# Patient Record
(demographics unavailable — no encounter records)

---
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
## 2018-01-04 NOTE — EDPHY
H & P


Time Seen by Provider: 01/04/18 16:18


HPI/ROS: 





HPI


Blood in urine.





29-year-old female by private vehicle.  She has a complex medical history which 

includes mental health illness, a recent miscarriage on December 13th of 2017 

and urinary retention.  She was seen in our emergency department for urinary 

retention on December 16th.  She was referred to a urologist.  She has been 

seen Dr. Zurita at [facility].  For the last 2 weeks she has been self 

catheterizing to relieve her retention.  She tells me the urologist are still 

trying to determine the cause of her urinary retention.  She reports today she 

has had some blood and small clots in her urine.  She denies fever.  She denies 

any significant abdominal pain. She reports that her last menstrual period was 

September 14th prior to her miscarriage.  She has had no back pain.  No 

vomiting. She denies any vaginal bleeding or vaginal discharge. No other 

complaints.





ROS:





Constitutional:  No fever, no chills.  No weakness.


Respiratory:  No cough.  No shortness of breath.


Cardiac:  No chest pain, no palpitations.


Gastrointestinal:  No abdominal pain, no vomiting, no diarrhea.


Genitourinary:  As above.  No dysuria or increased frequency with urination.


Musculoskeletal:  No back pain.  No neck pain.  No myalgias or arthralgias.


Skin:  No rashes.


Neurological:  No headache.  No focal weakness or altered sensation.





Past medical history:  Mental health disorder.  As above.





Social history:  Smokes cigarettes and marijuana.  Denies IV drugs or street 

drugs.  Here by herself currently.





Physical Exam:





General Appearance:  Alert, no distress.  This patient is responding to 

questions appropriately and in full sentences.  This patient appears well-

hydrated and well-nourished.


Eyes:  Pupils equal and round no pallor or injection.  No lid edema, erythema 

or injection.


Gastrointestinal:  Abdomen is soft and nontender on palpation throughout, no 

masses, bowel sounds normal.  No focal tenderness at McBurney's point.  No 

Regalado sign.


Neurological:  Motor sensory function is grossly intact.  Cranial nerves are 

normal.  Gait is normal.


Skin:  Warm and dry, no rashes.


Musculoskeletal:  No CVA tenderness bilaterally.


Extremities are symmetrical.  All joints range without pain or impingement.


Psychiatric:  No agitation.  No depression.





Database:





EKG:





Imaging:





Procedures:





Emergency department course:





Vital signs have been reviewed and are normal.  She is afebrile.  Urine sample 

to be obtained. Patient's presentation is consistent with cystitis/urinary 

tract infection.  I have reviewed her blood work which was taken just yesterday 

on January 3rd.  Her CBC is unremarkable.  Her complete metabolic panel is 

unremarkable. Renal function is normal.  She had a negative pregnancy test at 

this time.  Patient reports that she urinated just prior to coming to the 

emergency department.  We are encouraging oral fluids and will obtain a urine 

sample when possible.





5:20 p.m., patient re-evaluated.  Urinalysis indicates red blood cells with 

trace leukocyte esterase and occasional white blood cells.  However, given her 

need to catheterize and subsequent high risk for infection we will treat her 

with Keflex.  She was given 500 mg in the emergency department.  She will be 

given a prescription for this medication to be taken 4 times daily for the next 

5 days.  She is to follow up with her urologist early next week for re-

evaluation.  She feels comfortable with this plan.  She already has a scheduled 

appointment with her urologist next week.  Return to emergency department 

precautions were thoroughly reviewed with her.  All of her questions were 

answered.  She was discharged in good condition.











Differential Diagnosis:





The differential diagnosis on this patient includes but is not limited to 

urinary tract infection, cystitis.  Malignancy, nephritic syndrome unlikely.  

This represents a partial list of diagnoses considered.  These considerations 

are based on history, physical exam, past history, reassessment and diagnostic 

testing.


Smoking Status: Current some day smoker


Constitutional: 


 Initial Vital Signs











Temperature (C)  37.2 C   01/04/18 16:17


 


Heart Rate  93   01/04/18 16:17


 


Respiratory Rate  16   01/04/18 16:17


 


Blood Pressure  128/78 H  01/04/18 16:17


 


O2 Sat (%)  98   01/04/18 16:17








 











O2 Delivery Mode               Room Air














Allergies/Adverse Reactions: 


 





adhesive [Adhesive] Allergy (Verified 01/04/18 16:17)


 


aspirin [Aspirin] Allergy (Verified 01/04/18 16:17)


 


clindamycin [Clindamycin] Allergy (Verified 01/04/18 16:17)


 


cyclobenzaprine HCl [From Flexeril] Allergy (Verified 01/04/18 16:17)


 


gabapentin [From Neurontin] Allergy (Verified 01/04/18 16:17)


 


hydromorphone HCl [From Dilaudid] Allergy (Verified 01/04/18 16:17)


 


ipratropium bromide [From Atrovent] Allergy (Verified 01/04/18 16:17)


 


ketorolac tromethamine [From Toradol] Allergy (Verified 01/04/18 16:17)


 


latex [Latex] Allergy (Verified 01/04/18 16:17)


 


lorazepam [From Ativan] Allergy (Verified 01/04/18 16:17)


 


nabumetone [From Relafen] Allergy (Verified 12/13/17 12:53)


 


prednisone [Prednisone] Allergy (Verified 12/13/17 12:53)


 


vancomycin [Vancomycin] Allergy (Verified 12/13/17 12:53)


 








Home Medications: 














 Medication  Instructions  Recorded


 


ALPRAZolam [Xanax 0.25 MG (*)]  08/31/17


 


Ondansetron Odt [Zofran Odt] 4 - 8 mg PO Q4PRN PRN #4 tab 12/13/17


 


Enlite Serter  12/16/17


 


Prazosin HCl  12/16/17


 


Prozac 10 MG (*)  12/16/17


 


Cephalexin [Keflex (*)] 500 mg PO Q6 5 Days  cap 01/04/18














Medical Decision Making





- Data Points


Laboratory Results: 


 











  01/04/18 01/04/18





  17:00 17:00


 


Urine Color    OTHER 





   


 


Urine Appearance    HAZY 





   


 


Urine pH    7.0 





    (5.0-7.5) 


 


Ur Specific Gravity    <= 1.005 





    (1.002-1.030) 


 


Urine Protein    NEGATIVE 





    (NEGATIVE) 


 


Urine Ketones    NEGATIVE 





    (NEGATIVE) 


 


Urine Blood    3+  H 





    (NEGATIVE) 


 


Urine Nitrate    NEGATIVE 





    (NEGATIVE) 


 


Urine Bilirubin    NEGATIVE 





    (NEGATIVE) 


 


Urine Urobilinogen    0.2 EU EU





    (0.2-1.0) 


 


Ur Leukocyte Esterase    TRACE  H 





    (NEGATIVE) 


 


Urine RBC    25-50 /hpf H /hpf





    (0-3) 


 


Urine WBC    OCCASIONAL /hpf /hpf





    (0-3) 


 


Ur Epithelial Cells    TRACE /lpf /lpf





    (NONE-1+) 


 


Urine Bacteria    TRACE /hpf H /hpf





    (NONE SEEN) 


 


Urine Glucose    NEGATIVE 





    (NEGATIVE) 


 


Urine Pregnancy Test  NEGATIVE   





   











Medications Given: 


 








Discontinued Medications





Cephalexin HCl (Keflex)  500 mg PO EDNOW ONE


   PRN Reason: Protocol


   Stop: 01/04/18 17:22


   Last Admin: 01/04/18 17:37 Dose:  500 mg








Departure





- Departure


Disposition: Home, Routine, Self-Care


Clinical Impression: 


 Hematuria, Possible urinary tract infection





Condition: Good


Instructions:  Cephalexin (By mouth), Urinary Tract Infection in Women (ED), 

Hematuria (ED)


Additional Instructions: 


Read and follow provided instructions.





Follow-up with your urologist at [facility] next week as scheduled.





Take medication as prescribed through entire course of treatment.





Return to the emergency department for worsening symptoms, back pain, fever, 

vomiting or other serious concerns.


Referrals: 


[facility] ([facility],. [Edm Groups for Call Sched] - As per Instructions


Xiang Zurita MD [Medical Doctor] - As per Instructions


Prescriptions: 


Cephalexin [Keflex (*)] 500 mg PO Q6 5 Days  cap

## 2018-09-01 NOTE — EDPHY
H & P


Time Seen by Provider: 09/01/18 21:35


HPI/ROS: 





CHIEF COMPLAINT:  PICC line not flowing





HISTORY OF PRESENT ILLNESS:  30-year-old female currently 9 weeks pregnant 

history of hyperemesis gravidarum with placement of right upper extremity PICC 

line 1 week ago to self administer IV Zofran presents to the ER via private 

vehicle stating that the PICC line is occluded.  She denies discoloration.  

Denies chest pain.  Denies abdominal pain.  Denies back or flank pain.  Denies 

fever chills.








REVIEW OF SYSTEMS:


10 systems reviewed and negative with the exception of the elements mentioned 

in the history of present illness








PAST MEDICAL & SURGICAL  HISTORY:  Currently 9 weeks pregnant.  Hyperemesis 

gravidarum





SOCIAL HISTORY:  Nonsmoker   











************


PHYSICAL EXAM





(Prior to examination, patient consented to physical exam, hands were washed 

and my usual and customary physical exam procedures followed)


1) GENERAL: Well-developed, well-nourished, alert and oriented.  Appears to be 

in no acute distress.


2) HEAD: Normocephalic, atraumatic


3) HEENT: Pupils equal, round, reactive to light bilaterally.  Sclera 

anicteric. 


4) NECK: Full range of motion, no meningeal signs.


5) LUNGS: Clear auscultation bilaterally, no wheezes, no rhonchi, no 

retractions.   


6) HEART: Regular rate and rhythm, no murmur, no heave, no gallop.


7) ABDOMEN: No guarding, no rebound, no focal tenderness, negative McBurney's, 

negative Regalado's, negative Rovsing's, negative peritoneal sign,


8) MUSCULOSKELETAL:  Right upper extremity:  PICC line insertion site in place 

with no signs of infection, no tenderness, no erythema, no induration.  Distal 

neurovascular status is normal with brisk pulses and capillary refill.  Soft 

compartments.


9) BACK: No CVA tenderness, no midline vertebral tenderness, no fluctuance, no 

step-off, no obvious trauma, no visual or palpable abnormality. 


10) SKIN: No rash, no petechiae. 


11) Psychiatric:  Patient is oriented X 3, there is no agitation.








***************





DIFFERENTIAL DIAGNOSIS:   In no particular order including but not limited to 

occluded PICC line, hyperemesis gravidarum, displaced PICC line





Smoking Status: Former smoker


Constitutional: 


 Initial Vital Signs











Temperature (C)  37.5 C   09/01/18 21:16


 


Heart Rate  79   09/01/18 21:16


 


Respiratory Rate  18   09/01/18 21:16


 


Blood Pressure  137/79 H  09/01/18 21:16


 


O2 Sat (%)  85 L  09/01/18 21:16








 











O2 Delivery Mode               Room Air














Allergies/Adverse Reactions: 


 





gabapentin Allergy (Unknown, Unverified 09/01/18 21:15)


 Pt reports hallucination


haloperidol Allergy (Unknown, Unverified 09/01/18 21:15)


 Pt reports "body on fire"


lorazepam Allergy (Unknown, Unverified 09/01/18 21:15)


 Pt unsure of reaction


nabumetone Allergy (Unknown, Unverified 09/01/18 21:15)


 Pt unsure of reaction


adhesive [Adhesive] Allergy (Verified 09/01/18 21:15)


 Pt reports hives


aspirin [Aspirin] Allergy (Verified 09/01/18 21:15)


 Pt unsure of reaction


clindamycin [Clindamycin] Allergy (Verified 09/01/18 21:15)


 Pt reports seizure


cyclobenzaprine HCl [From Flexeril] Allergy (Verified 09/01/18 21:15)


 Pt reports inability to urinate


hydromorphone HCl [From Dilaudid] Allergy (Verified 09/01/18 21:15)


 Pt reports migraine


ipratropium bromide [From Atrovent] Allergy (Verified 09/01/18 21:15)


 Pt reports throat itching


ketorolac tromethamine [From Toradol] Allergy (Verified 09/01/18 21:15)


 Pt unsure of reaction


latex [Latex] Allergy (Verified 09/01/18 21:15)


 Pt reports itching


prednisone [Prednisone] Allergy (Verified 09/01/18 21:15)


 Pt reports rash with oral prednisone


vancomycin [Vancomycin] Allergy (Verified 09/01/18 21:15)


 Pt reports seizure


cyclobenzaprine HCl Allergy (Unknown, Uncoded 09/01/18 21:15)


 Pt reports inability to urinate


hydromorphone HCl Allergy (Unknown, Uncoded 09/01/18 21:15)


 Pt reports migraine


ipratropium bromide Allergy (Unknown, Uncoded 09/01/18 21:15)


 Pt reports throat itching


ketorolac tromethamine Allergy (Unknown, Uncoded 09/01/18 21:15)


 Pt unsure of reaction








Home Medications: 














 Medication  Instructions  Recorded


 


Herbals/Supplements -Info Only 1 ea PO DAILY 08/21/18


 


Lithium Carbonate [Lithium 300 mg PO DAILY 08/21/18





Carbonate Cap 300 mg (*)]  


 


Lithium Carbonate [Lithium 600 mg PO HS 08/21/18





Carbonate Cap 300 mg (*)]  


 


Promethazine HCl [Phenergan 25mg 25 mg PO Q6HRS PRN  tab 08/24/18





(*)]  


 


Pyridoxine HCl [Vitamin B-6 25 mg 25 mg PO TID PRN  tab 08/24/18





(*)]  














MDM/Departure





- MDM


ED Course/Re-evaluation: 





Nursing staff has manipulated the patient's PICC line and is now patent.  She 

declines antiemetic in the emergency department stating that she has IV Zofran 

at home.  Plan will be discharge home.  Instructed on trouble shooting PICC 

lines.  I saw this patient independently based on established practice 

protocols.  Care of patient under supervision of  secondary supervising 

physician Dr Kaminski .  Initial pulse oxygenation 85% on room air, repeat at 9:

40 p.m. was 97% on room air.





- Depart


Disposition: Home, Routine, Self-Care


Clinical Impression: 


Occluded PICC line


Qualifiers:


 Encounter type: initial encounter Qualified Code(s): T82.898A - Other 

specified complication of vascular prosthetic devices, implants and grafts, 

initial encounter





Condition: Good


Instructions:  Hyperemesis Gravidarum (ED)


Additional Instructions: 


If your PICC line stops working, if you are unable to pass medicine or any 

other symptoms, seek medical attention


Referrals: 


Rose Sifuentes MD [Primary Care Provider] - As per Instructions

## 2018-09-27 NOTE — EDPHY
H & P


Stated Complaint: Dizzy, disoriented, fatigued x 24 hrs. Pregnant 13wks.


Time Seen by Provider: 18 13:16


HPI/ROS: 





CHIEF COMPLAINT:  Fatigue, PICC line does not work





HISTORY OF PRESENT ILLNESS:  30-year-old  female 13 weeks pregnant presents 

with PICC line malfunction and fatigue.  She had a PICC line placed for hyper 

emesis and has been receiving IV fluids twice daily.  Last IV fluids yesterday.

  Onset of pain in the right upper extremity 2 days ago, described as achiness.

  Onset of excessive fatigue yesterday, associated with low-grade temperature 

of 99 degrees.  No URI symptoms, abdominal pain, chest pain or dysuria.  Seen 

by [facility] just prior to arrival and sent to the ED for PICC line 

problem.





REVIEW OF SYSTEMS:  complete 10 point ROS reviewed and is negative except for 

the noted elements in the HPI








- Personal History


LMP (Females 10-55): Pregnant


Current Tetanus/Diphtheria Vaccine: Yes


Current Tetanus Diphtheria and Acellular Pertussis (TDAP): Yes


Tetanus Vaccine Date: within 10 years





- Medical/Surgical History


Hx Asthma: No


Hx Chronic Respiratory Disease: No


Hx Diabetes: No


Hx Cardiac Disease: No


Hx Renal Disease: No


Hx Cirrhosis: No


Hx Alcoholism: Yes


Hx HIV/AIDS: No


Hx Splenectomy or Spleen Trauma: No


Other PMH: SINUS SURGERY, TONSILLECTOMY,  . Aspergers. PTSD, 

bipolar.  Appendectomy.  1 , 2 's,





- Social History


Smoking Status: Former smoker


Alcohol Use: Sober


Drug Use: None





- Physical Exam


Exam: 





General Appearance:  Drowsy, nontoxic-appearing, pleasant


Eyes:  Pupils equal and round, no conjunctival pallor or injection


ENT, Mouth:  Mucous membranes moist


Neck:  Normal inspection


Respiratory:  Lungs are clear to auscultation


Cardiovascular:  Regular rate and rhythm


Gastrointestinal:  Abdomen is soft and nontender


Neurological:  Drowsy, oriented x3, nonfocal exam


Skin:  Warm and dry


Extremities:  Right upper extremity-PICC line in place, no surrounding erythema 

warmth or tenderness, no tenderness or swelling of the right upper extremity


Vascular:  2+ radial pulses


Psychiatric:  Flat affect





Constitutional: 


 Initial Vital Signs











Temperature (C)  37.2 C   18 12:10


 


Heart Rate  85   18 12:10


 


Respiratory Rate  16   18 12:10


 


Blood Pressure  136/82 H  18 12:10


 


O2 Sat (%)  98   18 12:10








 











O2 Delivery Mode               Room Air














Allergies/Adverse Reactions: 


 





gabapentin Allergy (Unknown, Unverified 18 21:15)


 Pt reports hallucination


haloperidol Allergy (Unknown, Unverified 18 21:15)


 Pt reports "body on fire"


lorazepam Allergy (Unknown, Unverified 18 21:15)


 Pt unsure of reaction


nabumetone Allergy (Unknown, Unverified 18 21:15)


 Pt unsure of reaction


adhesive [Adhesive] Allergy (Verified 18 21:15)


 Pt reports hives


aspirin [Aspirin] Allergy (Verified 18 21:15)


 Pt unsure of reaction


clindamycin [Clindamycin] Allergy (Verified 18 21:15)


 Pt reports seizure


cyclobenzaprine HCl [From Flexeril] Allergy (Verified 18 21:15)


 Pt reports inability to urinate


hydromorphone HCl [From Dilaudid] Allergy (Verified 18 21:15)


 Pt reports migraine


ipratropium bromide [From Atrovent] Allergy (Verified 18 21:15)


 Pt reports throat itching


ketorolac tromethamine [From Toradol] Allergy (Verified 18 21:15)


 Pt unsure of reaction


latex [Latex] Allergy (Verified 18 21:15)


 Pt reports itching


prednisone [Prednisone] Allergy (Verified 18 21:15)


 Pt reports rash with oral prednisone


vancomycin [Vancomycin] Allergy (Verified 18 21:15)


 Pt reports seizure


cyclobenzaprine HCl Allergy (Unknown, Uncoded 18 21:15)


 Pt reports inability to urinate


hydromorphone HCl Allergy (Unknown, Uncoded 18 21:15)


 Pt reports migraine


ipratropium bromide Allergy (Unknown, Uncoded 18 21:15)


 Pt reports throat itching


ketorolac tromethamine Allergy (Unknown, Uncoded 18 21:15)


 Pt unsure of reaction








Home Medications: 














 Medication  Instructions  Recorded


 


Herbals/Supplements -Info Only 1 ea PO DAILY 18


 


Lithium Carbonate [Lithium 300 mg PO DAILY 18





Carbonate Cap 300 mg (*)]  


 


Lithium Carbonate [Lithium 600 mg PO HS 18





Carbonate Cap 300 mg (*)]  


 


Promethazine HCl [Phenergan 25mg 25 mg PO Q6HRS PRN  tab 18





(*)]  


 


Pyridoxine HCl [Vitamin B-6 25 mg 25 mg PO TID PRN  tab 18





(*)]  














Medical Decision Making





- Diagnostics


Imaging Results: 


 Imaging Impressions





Extremity Venous Study  18 13:29


Impression: No evidence of a right upper extremity DVT.


 


Findings and recommendations discussed with MOLLY MTZ  at 1443 hour, 2018.








PICC Line Exchange  18 16:48


Impression:  4 [place] single lumen peripherally inserted central catheter is 

ready to use.


 











ED Course/Re-evaluation: 





This patient presents with excessive fatigue and right upper extremity 

discomfort.  No evidence of infection on physical exam.  Leukocytosis noted; in 

review of previous WBC's, leukocytosis present throughout this pregnancy 

without change today.  Right upper extremity ultrasound reveals no evidence of 

DVT. Cathflo placed in the PICC line, unsuccessful.  Ultimately the PICC line 

was replaced and the left upper extremity.  The patient was tolerating oral 

fluids and food well.  Felt better after IV hydration and oral intake.  Unclear 

etiology of low-grade fever.  Encouraged patient to follow up with PCP. Will 

return for worsening symptoms or any concerns.


Differential Diagnosis: 





Differential diagnosis includes severe dehydration, DVT, pyelonephritis, 

cholecystitis, influenza, cellulitis, pneumonia, abscess, meningitis.





- Data Points


Laboratory Results: 


 Laboratory Results





 18 13:48 





 18 13:48 





 











  18





  13:49 13:48 13:48


 


WBC      12.81 10^3/uL H 10^3/uL





     (3.80-9.50) 


 


RBC      4.20 10^6/uL 10^6/uL





     (4.18-5.33) 


 


Hgb      12.8 g/dL g/dL





     (12.6-16.3) 


 


Hct      36.6 % L %





     (38.0-47.0) 


 


MCV      87.1 fL fL





     (81.5-99.8) 


 


MCH      30.5 pg pg





     (27.9-34.1) 


 


MCHC      35.0 g/dL g/dL





     (32.4-36.7) 


 


RDW      13.2 % %





     (11.5-15.2) 


 


Plt Count      175 10^3/uL 10^3/uL





     (150-400) 


 


MPV      11.2 fL fL





     (8.7-11.7) 


 


Neut % (Auto)      79.4 % H %





     (39.3-74.2) 


 


Lymph % (Auto)      14.0 % L %





     (15.0-45.0) 


 


Mono % (Auto)      4.4 % L %





     (4.5-13.0) 


 


Eos % (Auto)      1.0 % %





     (0.6-7.6) 


 


Baso % (Auto)      0.3 % %





     (0.3-1.7) 


 


Nucleat RBC Rel Count      0.0 % %





     (0.0-0.2) 


 


Absolute Neuts (auto)      10.16 10^3/uL H 10^3/uL





     (1.70-6.50) 


 


Absolute Lymphs (auto)      1.79 10^3/uL 10^3/uL





     (1.00-3.00) 


 


Absolute Monos (auto)      0.57 10^3/uL 10^3/uL





     (0.30-0.80) 


 


Absolute Eos (auto)      0.13 10^3/uL 10^3/uL





     (0.03-0.40) 


 


Absolute Basos (auto)      0.04 10^3/uL 10^3/uL





     (0.02-0.10) 


 


Absolute Nucleated RBC      0.00 10^3/uL 10^3/uL





     (0-0.01) 


 


Immature Gran %      0.9 % %





     (0.0-1.1) 


 


Immature Gran #      0.12 10^3/uL H 10^3/uL





     (0.00-0.10) 


 


Sodium    136 mEq/L mEq/L  





    (135-145)  


 


Potassium    3.6 mEq/L mEq/L  





    (3.3-5.0)  


 


Chloride    105 mEq/L mEq/L  





    ()  


 


Carbon Dioxide    23 mEq/l mEq/l  





    (22-31)  


 


Anion Gap    8 mEq/L mEq/L  





    (8-16)  


 


BUN    10 mg/dL mg/dL  





    (7-23)  


 


Creatinine    0.5 mg/dL L mg/dL  





    (0.6-1.0)  


 


Estimated GFR    > 60   





    


 


Glucose    71 mg/dL mg/dL  





    ()  


 


Calcium    9.2 mg/dL mg/dL  





    (8.5-10.4)  


 


Urine Color  PALE YELLOW     





    


 


Urine Appearance  CLEAR     





    


 


Urine pH  6.0     





   (5.0-7.5)   


 


Ur Specific Gravity  1.005     





   (1.002-1.030)   


 


Urine Protein  NEGATIVE     





   (NEGATIVE)   


 


Urine Ketones  NEGATIVE     





   (NEGATIVE)   


 


Urine Blood  NEGATIVE     





   (NEGATIVE)   


 


Urine Nitrate  NEGATIVE     





   (NEGATIVE)   


 


Urine Bilirubin  NEGATIVE     





   (NEGATIVE)   


 


Urine Urobilinogen  NEGATIVE EU EU    





   (0.2-1.0)   


 


Ur Leukocyte Esterase  NEGATIVE     





   (NEGATIVE)   


 


Urine Glucose  NEGATIVE     





   (NEGATIVE)   











Medications Given: 


 








Discontinued Medications





Alteplase, Recombinant (Cathflo Activase)  2 mg IVP EDNOW ONE


   Stop: 18 15:00


   Last Admin: 18 15:25 Dose:  2 mg


Sodium Chloride (Ns)  1,000 mls @ 0 mls/hr IV ONCE ONE; Wide Open


   PRN Reason: Protocol


   Stop: 18 13:30


   Last Admin: 18 14:05 Dose:  1,000 mls








Departure





- Departure


Disposition: Home, Routine, Self-Care


Referrals: 


Rose Sifuentes MD [Primary Care Provider] - As per Instructions

## 2018-12-03 NOTE — EDPHY
H & P


Time Seen by Provider: 12/03/18 18:21


HPI/ROS: 





This patient presents with a cough that she feels is not improving after 

starting Zithromax Friday, 4 days prior to arrival.  The cough started 7 days 

prior to this visit.  She was prescribed a Z-Kwabena by her primary care physician.

  She was seen by her OBGYN physician earlier today who recommended that she 

come in for evaluation because of ongoing cough.  The patient reports is 

primarily dry cough with wheeze.  She denies any additional symptoms.  She 

simply reports the cough is not improved and the wheeze persists.  She was not 

initially prescribed inhaler.  She has use an inhaler during bronchitis 

episodes in the past however.  Her brother drove the patient here by private 

vehicle.  The patient is 6 months pregnant with healthy intrauterine pregnancy





ROS:  Constitutional:  No fevers


HEENT:  Mild coryza.  No sinus pain


Pulmonary:  No pleuritic pain or hemoptysis.  No respiratory distress


Cardiovascular:  No heart palpitations or lightheadedness.  She has mild leg 

swelling she attributes to pregnancy with no calf pain


GI:  No nausea or vomiting


Integumentary no rash


Neuro:  No headache


:  No vaginal bleeding


10 point review of symptoms is performed and otherwise negative with exception 

of pertinent positives and negatives listed in HPI and ROS








Past Medical/Surgical History: 





I reviewed the nurse's documentation past with history is patient


Smoking Status: Former smoker


Physical Exam: 





Physical Exam


Vital signs are normal.


General:  No acute distress


HEENT:  Nose:  Clear discharge bilaterally. No sinus tenderness to percussion.  

Ears:  External canals and tympanic membranes are clear with no erythema or 

abnormal findings bilaterally. Oropharynx:  No erythema or exudates. No 

dysphonia.  No drooling or stridor.  


Eyes:  Pupils equal and react to light.  Extraocular motions are intact.


Neck:  Supple with no meningismus.  No lymphadenopathy


Lungs:  Mild expiratory wheeze bilaterally.  Minimal rhonchi.  No rales.


Cardiac:  Regular rate and rhythm with no murmur gallop or rub.  She has no 

calf tenderness and no significant edema at this time.


Abdomen:  Gravid uterus-nontender


Skin:  No rash or pallor.


Neuro:  Alert with no focal deficits noted.





Initial differential diagnosis:  URI with cough and reactive airway disease, 

bronchitis, doubt pneumonia given lack of fever, hypoxia or rales.


Constitutional: 


 Initial Vital Signs











Temperature (C)  37 C   12/03/18 18:20


 


Heart Rate  88   12/03/18 18:20


 


Respiratory Rate  20   12/03/18 18:20


 


Blood Pressure  138/80 H  12/03/18 18:20


 


O2 Sat (%)  98   12/03/18 18:20








 











O2 Delivery Mode               Room Air














Allergies/Adverse Reactions: 


 





gabapentin Allergy (Unknown, Verified 12/03/18 18:20)


 Pt reports hallucination


haloperidol Allergy (Unknown, Verified 12/03/18 18:20)


 Pt reports "body on fire"


lorazepam Allergy (Unknown, Verified 12/03/18 18:20)


 Pt unsure of reaction


nabumetone Allergy (Unknown, Verified 12/03/18 18:20)


 Pt unsure of reaction


adhesive [Adhesive] Allergy (Verified 12/03/18 18:20)


 Pt reports hives


aspirin [Aspirin] Allergy (Verified 12/03/18 18:20)


 Pt unsure of reaction


clindamycin [Clindamycin] Allergy (Verified 12/03/18 18:20)


 Pt reports seizure


cyclobenzaprine HCl [From Flexeril] Allergy (Verified 12/03/18 18:20)


 Pt reports inability to urinate


hydromorphone HCl [From Dilaudid] Allergy (Verified 12/03/18 18:20)


 Pt reports migraine


ipratropium bromide [From Atrovent] Allergy (Verified 12/03/18 18:20)


 Pt reports throat itching


ketorolac tromethamine [From Toradol] Allergy (Verified 12/03/18 18:20)


 Pt unsure of reaction


latex [Latex] Allergy (Verified 12/03/18 18:20)


 Pt reports itching


prednisone [Prednisone] Allergy (Verified 12/03/18 18:20)


 Pt reports rash with oral prednisone


vancomycin [Vancomycin] Allergy (Verified 12/03/18 18:20)


 Pt reports seizure


cyclobenzaprine HCl Allergy (Unknown, Uncoded 12/03/18 18:20)


 Pt reports inability to urinate


hydromorphone HCl Allergy (Unknown, Uncoded 12/03/18 18:20)


 Pt reports migraine


ipratropium bromide Allergy (Unknown, Uncoded 12/03/18 18:20)


 Pt reports throat itching


ketorolac tromethamine Allergy (Unknown, Uncoded 12/03/18 18:20)


 Pt unsure of reaction








Home Medications: 














 Medication  Instructions  Recorded


 


Herbals/Supplements -Info Only 1 ea PO DAILY 08/21/18


 


Lithium Carbonate [Lithium 300 mg PO DAILY 08/21/18





Carbonate Cap 300 mg (*)]  


 


Lithium Carbonate [Lithium 600 mg PO HS 08/21/18





Carbonate Cap 300 mg (*)]  


 


Albuterol Hfa Anes Only [Proair 2 puffs IH Q4 PRN #1 mdi 12/03/18





Hfa Icu (*)]  


 


Calcium  12/03/18


 


Docusate Sodium  12/03/18


 


Fluticasone Hfa 220 Mcg [Flovent 2 puffs IH DAILY #1 mdi 12/03/18





220 MCG Hfa MDI (*)]  


 


Magnesium  12/03/18


 


Multivitamin  12/03/18


 


Omega-3  12/03/18


 


Senna  12/03/18


 


Vitamin C  12/03/18


 


l-Methylfolate-Algal 15 mg Cap  12/03/18














MDM/Departure





- MDM


Medications Given: 


 








Discontinued Medications





Albuterol (Proventil Neb)  3 ml IH EDNOW ONE


   Stop: 12/03/18 18:41


   Last Admin: 12/03/18 18:45 Dose:  3 ml


Albuterol (Proventil Neb)  3 ml IH EDNOW ONE


   Stop: 12/03/18 19:20


   Last Admin: 12/03/18 19:35 Dose:  3 ml


Levalbuterol (Xopenex 1.25mg Neb)  1.25 mg IH EDNOW ONE


   Stop: 12/03/18 19:01


   Last Admin: 12/03/18 19:11 Dose:  1.25 mg





ED Course/Re-evaluation: 





Albuterol neb, Xopenex neb, albuterol neb with mild reduction wheezing in 

frequency of cough.





Discussion:  Patient with bronchitis versus URI with cough and reactive airway 

disease improved mildly with beta agonist.  Will plan to treat her with 

albuterol inhaler and Flovent.  I counseled regarding this.  Instructed her to 

complete her Zithromax course in addition.  She does not have clinical findings 

that suggest lower respiratory infection, pulmonary embolism or other red flag 

findings.  However she understands need to return emergency department should 

she develop worsening symptoms despite treatment plan





- Depart


Disposition: Home, Routine, Self-Care


Clinical Impression: 


Acute bronchitis


Qualifiers:


 Bronchitis organism: unspecified organism Qualified Code(s): J20.9 - Acute 

bronchitis, unspecified





Condition: Good


Instructions:  Acute Bronchitis (ED)


Additional Instructions: 


Diagnosis:  Bronchitis





Plan:  Humidifier





Complete you're Zithromax antibiotic





Albuterol inhaler with spacer for cough, wheeze or shortness of breath





Flovent steroid inhaler in addition to diminish the inflammation in bronchi





Follow up primary care physician if her symptoms are not improving over the 

next 5-7 days with treatment plan





Return emergency department for significant shortness of breath despite 

albuterol, high fevers or other concerns


Prescriptions: 


Albuterol Hfa Anes Only [Proair Hfa Icu (*)] 2 puffs IH Q4 PRN #1 mdi


 PRN Reason: Wheezing


Fluticasone Hfa 220 Mcg [Flovent 220 MCG Hfa MDI (*)] 2 puffs IH DAILY #1 mdi


Referrals: 


Rose Sifuentes MD [Primary Care Provider] - As per Instructions

## 2018-12-20 NOTE — EDPHY
H & P


Stated Complaint: injection site infected


Time Seen by Provider: 18 20:30


HPI/ROS: 





CHIEF COMPLAINT:  Hematoma





HISTORY OF PRESENT ILLNESS:  Patient is a 30-year-old obese female who is 25 

weeks gestational age receive supplemental progesterone because of  

labor with her previous children.  She received an injection in her right 

triceps area on Monday.  She has had bruising and swelling in that area ever 

since.  She has had itchy reactions in the past from the shots but never 

bruising and swelling.  She has been taking Benadryl for the itching.  She was 

concerned about infection and her primary recommended she come to the ER for 

evaluation.  She has not had a fever.  No swelling in her hand elbow or wrist.  

Normal pulses.  Normal sensation and movement.


Severity:  Moderate


Modifying factors:  None





REVIEW OF SYSTEMS:


Constitutional:  denies: chills, fever, recent illness, recent injury


EENTM: denies: blurred vision, double vision, nose congestion


Respiratory: denies: cough, shortness of breath


Cardiac: denies: chest pain, irregular heart rate, lightheadedness, palpitations


Gastrointestinal/Abdominal: denies: abdominal pain, diarrhea, nausea, vomiting, 

blood streaked stools


Genitourinary: denies: dysuria, frequency, hematuria, pain


Musculoskeletal: denies: joint pain, muscle pain


Skin: denies: lesions, rash, jaundice, bruising


Neurological: denies: headache, numbness, paresthesia, tingling, dizziness, 

weakness


Hematologic/Lymphatic: denies: blood clots, easy bleeding, easy bruising


Immunologic/allergic: denies: HIV/AIDS, transplant


 10 systems reviewed and negative except as noted





EXAM:


GENERAL:  Well-appearing, obese and in no acute distress.


HEAD:  Atraumatic, normocephalic.


EYES:  Pupils equal round and reactive to light, extraocular movements intact, 

sclera anicteric, conjunctiva are normal.


ENT:  TMs normal, nares patent, oropharynx clear without exudates.  Moist 

mucous membranes.


NECK:  Normal range of motion, supple without lymphadenopathy or JVD.


LUNGS:  Breath sounds clear to auscultation bilaterally and equal.  No wheezes 

rales or rhonchi.


HEART:  Regular rate and rhythm without murmurs, rubs or gallops.


ABDOMEN:  Soft, nontender, normoactive bowel sounds.  No guarding, no rebound.  

No masses appreciated. 


BACK:  No CVA tenderness, no spinal tenderness, step-offs or deformities


EXTREMITIES:  Normal range of motion, no pitting or edema.  No clubbing or 

cyanosis.


NEUROLOGICAL:  Cranial nerves II through XII grossly intact.  Normal speech, 

normal gait.  5/5 strength, normal movement in all extremities, normal sensation

, normal reflexes


PSYCH:  Normal mood, normal affect.


SKIN:  Patient has a firm hematoma and bruising to the right triceps area, very 

minimal redness and warmth.  No cellulitic changes.  No abscess.  No fluctuance.








Source: Patient


Exam Limitations: No limitations





- Personal History


LMP (Females 10-55): Pregnant


Tetanus Vaccine Date: 





- Medical/Surgical History


Hx Asthma: No


Hx Chronic Respiratory Disease: No


Hx Diabetes: No


Hx Cardiac Disease: No


Hx Renal Disease: No


Hx Cirrhosis: No


Hx Alcoholism: Yes


Hx HIV/AIDS: No


Hx Splenectomy or Spleen Trauma: No


Other PMH: SINUS SURGERY, TONSILLECTOMY,  . Aspergers. PTSD, 

bipolar.  Appendectomy.  1 , 2 's,





- Family History


Significant Family History: No pertinent family hx





- Social History


Smoking Status: Former smoker


Alcohol Use: Sober


Drug Use: None


Constitutional: 


 Initial Vital Signs











Temperature (C)  36.6 C   18 20:24


 


Heart Rate  87   18 20:24


 


Respiratory Rate  20   18 20:24


 


Blood Pressure  167/75 H  18 20:24


 


O2 Sat (%)  97   18 20:24








 











O2 Delivery Mode               Room Air














Allergies/Adverse Reactions: 


 





gabapentin Allergy (Unknown, Verified 18 18:20)


 Pt reports hallucination


haloperidol Allergy (Unknown, Verified 18 18:20)


 Pt reports "body on fire"


lorazepam Allergy (Unknown, Verified 18 18:20)


 Pt unsure of reaction


nabumetone Allergy (Unknown, Verified 18 18:20)


 Pt unsure of reaction


adhesive [Adhesive] Allergy (Verified 18 18:20)


 Pt reports hives


aspirin [Aspirin] Allergy (Verified 18 18:20)


 Pt unsure of reaction


clindamycin [Clindamycin] Allergy (Verified 18 18:20)


 Pt reports seizure


cyclobenzaprine HCl [From Flexeril] Allergy (Verified 18 18:20)


 Pt reports inability to urinate


hydromorphone HCl [From Dilaudid] Allergy (Verified 18 18:20)


 Pt reports migraine


ipratropium bromide [From Atrovent] Allergy (Verified 18 18:20)


 Pt reports throat itching


ketorolac tromethamine [From Toradol] Allergy (Verified 18 18:20)


 Pt unsure of reaction


latex [Latex] Allergy (Verified 18 18:20)


 Pt reports itching


prednisone [Prednisone] Allergy (Verified 18 18:20)


 Pt reports rash with oral prednisone


vancomycin [Vancomycin] Allergy (Verified 18 18:20)


 Pt reports seizure


cyclobenzaprine HCl Allergy (Unknown, Uncoded 18 18:20)


 Pt reports inability to urinate


hydromorphone HCl Allergy (Unknown, Uncoded 18 18:20)


 Pt reports migraine


ipratropium bromide Allergy (Unknown, Uncoded 18 18:20)


 Pt reports throat itching


ketorolac tromethamine Allergy (Unknown, Uncoded 18 18:20)


 Pt unsure of reaction








Home Medications: 














 Medication  Instructions  Recorded


 


Herbals/Supplements -Info Only 1 ea PO DAILY 18


 


Lithium Carbonate [Lithium 300 mg PO DAILY 18





Carbonate Cap 300 mg (*)]  


 


Lithium Carbonate [Lithium 600 mg PO HS 18





Carbonate Cap 300 mg (*)]  


 


Albuterol Hfa Anes Only [Proair 2 puffs IH Q4 PRN #1 mdi 18





Hfa Icu (*)]  


 


Calcium  18


 


Docusate Sodium  18


 


Fluticasone Hfa 220 Mcg [Flovent 2 puffs IH DAILY #1 mdi 18





220 MCG Hfa MDI (*)]  


 


Magnesium  18


 


Multivitamin  18


 


Omega-3  18


 


Senna  18


 


Vitamin C  18


 


l-Methylfolate-Algal 15 mg Cap  18














Medical Decision Making


ED Course/Re-evaluation: 





The patient has a resolving hematoma to her right triceps area.  The bruising 

is fading.  I suspect that the very slight warmth and erythema is from the 

resolving hematoma.  I am not suspicious of cellulitis or abscess.  She is 

afebrile.  I am not suspicious for DVT based on the history and location and 

exam of the rest of her arm.  I encouraged ibuprofen as well as heat packs.  

She will also continue taking Benadryl.  We discussed indications for returning 

including fevers or worsening erythema.  Also recommended she follow up with 

her doctor in the next 24-48 hours for re-evaluation.  I do not think 

antibiotics are indicated at this time.


Differential Diagnosis: 





Partial list of the Differential diagnosis considered include but were not 

limited to;  hematoma, cellulitis, allergic reaction, lymphadenopathy and 

although unlikely based on the history and physical exam, I also considered 

abscess, dissection, aneurysm, DVT.  I discussed these differential diagnoses 

and the plan with the patient as well as the usual and expected course.  The 

patient understands that the diagnosis is provisional and that in medicine we 

are not always correct and that further workup is often warranted.  Usual and 

customary warnings were given.  All of the patient's questions were answered.  

The patient was instructed to return to the emergency department should the 

symptoms at all worsen or return, otherwise to followup with the physician as 

we discussed.





Departure





- Departure


Disposition: Home, Routine, Self-Care


Clinical Impression: 


 Hematoma and contusion





Condition: Fair


Instructions:  Hematoma (ED)


Referrals: 


Rose Sifuentes MD [Primary Care Provider] - 1-2 days without fail

## 2018-12-25 NOTE — EDPHY
H & P


Time Seen by Provider: 12/25/18 22:45


HPI/ROS: 





Chief complaint: Thumb skin avulsion 5 hours ago 





HPI: 


Using a potatoe [place], she caught the top of the nondominant thumb, left.  It 

will not stop bleeding.  Occurred in the kitchen, at home. 





 Right Handed 


Injury of the left thumb, from 


This happened at home  , occurring  5 hr ago


Reports there is no numbness or loss of sensation.


Contamination:  None except for in the kitchen


FB possibility  no





Last Td or TDAP:   more than 5 years but less than 10 years  





Work:  Homemaker


Avocation:  Homemaker








ROS


Neuro: No numbness or tingling or loss of sensation











Smoking Status: Former smoker


Physical Exam: 





Gen:   Well-developed.  Well-nourished.  No odor of alcohol.  Nontoxic.  

Afebrile.


Extremity: There is a 1 x 0.5 x 1 cm, triangular  avulsion laceration that is 

split  thickness to the IP joint on the dorsum of the left thumb that does not 

go to the tendon.


Function:    Without signs of tendon dysfunction


NV Status:  Intact


CMS:  Intact


Constitutional: 


 Initial Vital Signs











Temperature (C)  36.6 C   12/25/18 22:42


 


Heart Rate  97   12/25/18 22:42


 


Respiratory Rate  16   12/25/18 22:42


 


Blood Pressure  140/85 H  12/25/18 22:42


 


O2 Sat (%)  96   12/25/18 22:42








 











O2 Delivery Mode               Room Air














Allergies/Adverse Reactions: 


 





gabapentin Allergy (Unknown, Verified 12/25/18 22:42)


 Pt reports hallucination


haloperidol Allergy (Unknown, Verified 12/25/18 22:42)


 Pt reports "body on fire"


lorazepam Allergy (Unknown, Verified 12/25/18 22:42)


 Pt unsure of reaction


nabumetone Allergy (Unknown, Verified 12/25/18 22:42)


 Pt unsure of reaction


adhesive [Adhesive] Allergy (Verified 12/25/18 22:42)


 Pt reports hives


aspirin [Aspirin] Allergy (Verified 12/25/18 22:42)


 Pt unsure of reaction


clindamycin [Clindamycin] Allergy (Verified 12/25/18 22:42)


 Pt reports seizure


cyclobenzaprine HCl [From Flexeril] Allergy (Verified 12/25/18 22:42)


 Pt reports inability to urinate


hydromorphone HCl [From Dilaudid] Allergy (Verified 12/25/18 22:42)


 Pt reports migraine


ipratropium bromide [From Atrovent] Allergy (Verified 12/25/18 22:42)


 Pt reports throat itching


ketorolac tromethamine [From Toradol] Allergy (Verified 12/25/18 22:42)


 Pt unsure of reaction


latex [Latex] Allergy (Verified 12/25/18 22:42)


 Pt reports itching


prednisone [Prednisone] Allergy (Verified 12/25/18 22:42)


 Pt reports rash with oral prednisone


vancomycin [Vancomycin] Allergy (Verified 12/25/18 22:42)


 Pt reports seizure


cyclobenzaprine HCl Allergy (Unknown, Uncoded 12/25/18 22:42)


 Pt reports inability to urinate


hydromorphone HCl Allergy (Unknown, Uncoded 12/25/18 22:42)


 Pt reports migraine


ipratropium bromide Allergy (Unknown, Uncoded 12/25/18 22:42)


 Pt reports throat itching


ketorolac tromethamine Allergy (Unknown, Uncoded 12/25/18 22:42)


 Pt unsure of reaction








Home Medications: 














 Medication  Instructions  Recorded


 


Herbals/Supplements -Info Only 1 ea PO DAILY 08/21/18


 


Lithium Carbonate [Lithium 300 mg PO DAILY 08/21/18





Carbonate Cap 300 mg (*)]  


 


Lithium Carbonate [Lithium 600 mg PO HS 08/21/18





Carbonate Cap 300 mg (*)]  


 


Albuterol Hfa Anes Only [Proair 2 puffs IH Q4 PRN #1 mdi 12/03/18





Hfa Icu (*)]  


 


Calcium  12/03/18


 


Docusate Sodium  12/03/18


 


Fluticasone Hfa 220 Mcg [Flovent 2 puffs IH DAILY #1 mdi 12/03/18





220 MCG Hfa MDI (*)]  


 


Magnesium  12/03/18


 


Multivitamin  12/03/18


 


Omega-3  12/03/18


 


Senna  12/03/18


 


Vitamin C  12/03/18


 


l-Methylfolate-Algal 15 mg Cap  12/03/18














Medical Decision Making


ED Course/Re-evaluation: 





This is an avulsion that is not gape nor does it require any suturing.  However 

it has been oozing for 5 hr thus she needs some form of reasonable dressing 

which we will use Gel-Foam.  I have instructed her on the care of the Gel-Foam 

to allow this to heal well.


Differential Diagnosis: 





Diagnostic considerations include, but are not limited to, the following:


Laceration, retained FB ,tendon injury .








Departure





- Departure


Disposition: Home, Routine, Self-Care


Clinical Impression: 


Avulsion of skin of finger


Qualifiers:


 Encounter type: initial encounter Qualified Code(s): S61.209A - Unspecified 

open wound of unspecified finger without damage to nail, initial encounter





Condition: Good


Additional Instructions: 


Keep the finger splinted for 2 weeks





The SURGICELL against the wound is to stay on there as an instant scab, then 

fall off in 10-14 days, on its own.





Keep it strictly dry for 48  hours until thursday the 27th evening.  Then, you 

may get it wet just long enough to shower and occaisionally wash your hands.  

FOr the 2 weeks, if dealying with stuff in the kitchen such as food preparation 

or cleaning, you will need to wear gloves.


Referrals: 


Patient,NotPresent [Unknown] - As per Instructions

## 2019-01-13 NOTE — OBPROG
Labor Progress Note


Assessment/Plan: 


Assessment: 29 yo  at 28w1d here with 


1) contractions, in setting of hx of cerclage / hx of  deliveries 

x 3 - stable and a little improved since yesterday with regards to pt's 

impression of contractions.  Neg FFN is reassuring. Did not take vaginal 

progesterone last night. 


2) Labile BPs - normal preeclamptic labs though P/C not collected yet as pt has 

forgotten to collect urine sample with voids this morning. Hx of IOL for 

preeclampsia at 36 wk with P1





Plan: Stop IV hydration. 


Regular diet.  


Serial BPs. 


urine P/C.


Discussed - if it seems contractions are increasing, would administer 

betamethasone, and transfer to [facility] - higher level NICU. 


If it seems no significant change or worsening later this afternoon, would 

consider dc home. 


Maida Miranda MD, FACOG


[facility] 





19 11:56





19 16:42


A/P: 30  at 28w1d here with  contractions, but no evidence of 

 labor - no change in cervix in nearly 20 hours on digital exam, no 

change in length since 1/10/19 MFM scan.  


Will DC home with  labor precautions. 


No evidence of preeclampsia. 


FU in office in 1 week as already scheduled. 


Maida Miranda MD, FACOG





19 16:47





Subjective/Intrapartum Course: 





19 06:32


Pt sleeping after Morphine, feels ctxns happening but infreq and much milder.  

They also lessened after Terb last noc.  Denies any bleeding or LOF.  GFM.


19 12:00


Pt sleeping when I went to see earlier.  Awoke in past 1-1.5 hours and is now 

hungry.  Feels contractions are no worse and maybe a little better than when 

she came in yesterday evening.   Currently contractions are about a 4 of 10 in 

pain, though pt cannot say how often she is feeling them. Yesterday, the pain 

with contractions was up to a 6 of 10.  


No VB, no LOF. 


Did not take vaginal progesterone last night. 


19 16:48


Pt feels achy from contractions, but they are overall less than they were on 

admission. 


No VB, no LOF. 


Objective: 





 





 19 06:40 





 19 06:40 





 











Patient ABO/Rh  O POSITIVE   19  06:40    


 


Uric Acid  3.4 mg/dL (2.5-6.8)   19  06:40    


 


Total Bilirubin  0.1 mg/dL (0.1-1.4)   19  06:40    


 


Conjugated Bilirubin  0.1 mg/dL (0.0-0.5)   19  06:40    


 


Unconjugated Bilirubin  0.0 mg/dL (0.0-1.1)   19  06:40    


 


AST  18 IU/L (14-46)   19  06:40    


 


ALT  22 IU/L (9-52)   19  06:40    


 


Lactate Dehydrogenase  508 IU/L (313-618)   19  06:40    








 











Temp Pulse Resp BP Pulse Ox


 


    89          


 


    19 01:40         








BPs 123/60  134/64  129/67  





TVUS done - cervical length 3.6cm - 4.2cm, no funneling with fundal pressure.  


SVE repeated - no change 1/ long/ cerclage palpable. 





gen - pt appears completely comfortable. 


abd - soft, gravid, NT





P/C = 0.12  





- SVE


Dilation (cm): 1


Effacement (%): 0, Less than 50


Station: -3


Membranes: Intact





- Contraction Pattern Assessment


Current Contraction Pattern: Irregular, Other (Specify) (q5-10 min irreg)





- FHR Assessment


  ** Rankin


FHR (bpm): 150


FHR Pattern Variability: Moderate


FHR Category: 1





- AP


Antepartum Course: 





19 06:37


high survelliance with MFM and freq u/s - last on 1/10.  LGA at 91% with normal 

fluid.  





- Physical Exam


Estimated Fetal Weight: Less than 2500g (1367 on last u/s)





Oxytocin Orders Assessment





- Pre-Induction/Augmentation Assessment


Gestational Age: 28 week(s) and 0 day(s)


Estimated Fetal Weight: Less than 2500g (1367 on last u/s)





ICD10 Worksheet


Patient Problems: 


 Problems











Problem Status Onset


 


Bipolar 1 disorder Acute  


 


History of  delivery, currently pregnant Acute  


 


History of  delivery, currently pregnant Acute  


 


 contractions Acute  














- ICD10 Problem Qualifiers


(1) History of  delivery, currently pregnant

## 2019-01-13 NOTE — OBPROG
Labor Progress Note


Assessment/Plan: 


Assessment: 29 yo  at 28w1d here with 


1) contractions, in setting of hx of cerclage / hx of  deliveries 

x 3 - stable and a little improved since yesterday with regards to pt's 

impression of contractions.  Neg FFN is reassuring. Did not take vaginal 

progesterone last night. 


2) Labile BPs - normal preeclamptic labs though P/C not collected yet as pt has 

forgotten to collect urine sample with voids this morning. Hx of IOL for 

preeclampsia at 36 wk with P1





Plan: Stop IV hydration. 


Regular diet.  


Serial BPs. 


urine P/C.


Discussed - if it seems contractions are increasing, would administer 

betamethasone, and transfer to [facility] - higher level NICU. 


If it seems no significant change or worsening later this afternoon, would 

consider dc home. 


Maida Miranda MD, [facility] 





19 11:56





Subjective/Intrapartum Course: 





19 06:32


Pt sleeping after Morphine, feels ctxns happening but infreq and much milder.  

They also lessened after Terb last noc.  Denies any bleeding or LOF.  GFM.


19 12:00


Pt sleeping when I went to see earlier.  Awoke in past 1-1.5 hours and is now 

hungry.  Feels contractions are no worse and maybe a little better than when 

she came in yesterday evening.   Currently contractions are about a 4 of 10 in 

pain, though pt cannot say how often she is feeling them. Yesterday, the pain 

with contractions was up to a 6 of 10.  


No VB, no LOF. 


Did not take vaginal progesterone last night. 


Objective: 





 





 19 06:40 





 19 06:40 





 











Patient ABO/Rh  O POSITIVE   19  06:40    


 


Uric Acid  3.4 mg/dL (2.5-6.8)   19  06:40    


 


Total Bilirubin  0.1 mg/dL (0.1-1.4)   19  06:40    


 


Conjugated Bilirubin  0.1 mg/dL (0.0-0.5)   19  06:40    


 


Unconjugated Bilirubin  0.0 mg/dL (0.0-1.1)   19  06:40    


 


AST  18 IU/L (14-46)   19  06:40    


 


ALT  22 IU/L (9-52)   19  06:40    


 


Lactate Dehydrogenase  508 IU/L (313-618)   19  06:40    








 











Temp Pulse Resp BP Pulse Ox


 


    89          


 


    19 01:40         








gen - pleasant, sleepy female, flat affect


CV - RRR


chest - CTAB


abd - gravid, soft, NT


ext - trace BLE edema, no calf tenderness





BPs:


0500  144/85


0800  111/59


1044  128/94


1136  159/97 with newer larger appropriately fitting cuff, but apparently 

during a contraction


1139  123/60





- SVE


Membranes: Intact





- Contraction Pattern Assessment


Current Contraction Pattern: Other (Specify) (none on monitor but pt feels occas

)





- FHR Assessment


  ** Rankin


FHR (bpm): 140


FHR Pattern Variability: Moderate


FHR Category: 1 (occasional variable decel to 120 for < 10 seconds, reassuring 

for gestational age.)





- AP


Antepartum Course: 





19 06:37


high survelliance with MFM and freq u/s - last on 1/10.  LGA at 91% with normal 

fluid.  





- Physical Exam


Estimated Fetal Weight: Less than 2500g (1367 on last u/s)


Neck: non-tender, full range of motion, supple


Respiratory: lungs clear


Cardiac/Chest: regular rate, rhythm


Abdomen: normal bowel sounds (gravid, NT)


Extremities: normal range of motion, pedal edema (trace), Celina's sign (neg)


Skin: normal color, warm/dry


Neuro/Psych: alert (though sleepy, appropriately interactive though flat affect)





Oxytocin Orders Assessment





- Pre-Induction/Augmentation Assessment


Gestational Age: 28 week(s) and 0 day(s)


Estimated Fetal Weight: Less than 2500g (1367 on last u/s)





ICD10 Worksheet


Patient Problems: 


 Problems











Problem Status Onset


 


Bipolar 1 disorder Acute  


 


History of  delivery, currently pregnant Acute  


 


History of  delivery, currently pregnant Acute  


 


 contractions Acute  














- ICD10 Problem Qualifiers


(1) History of  delivery, currently pregnant
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
## 2019-01-13 NOTE — OBPROG
Labor Progress Note


Assessment/Plan: 


Assessment:


IUP at 28 wks,  ctxns seem to have lessened with sleep


 with all PTD, one at 30 wks, with cerclage in place, using vag prog


PCS x1,  x2, placenta ant and low


Bipolar on Lithium, sees therapist, mood stable


echo normal, rec echo after birth to reassess aortic arch


h/o preeclampsia with first, on LDA....several BP in 140s but diastolics 

low....will get PIH labs and urine for reference





























Plan:


Ctxns have really lessened per pt report - -2/10 and she feels infreq and pt 

sleeping through them.  No showing on monitor.


Will cont survelliance today as pt high risk and might need Mag and steroids if 

further building ctxns.


19 06:22





Subjective/Intrapartum Course: 





19 06:32


Pt sleeping after Morphine, feels ctxns happening but infreq and much milder.  

They also lessened after Terb last noc.  Denies any bleeding or LOF.  GFM.


Objective: 





 





 19 23:00 





 











Temp Pulse Resp BP Pulse Ox


 


    89          


 


    19 01:40         














- SVE


Membranes: Intact





- Contraction Pattern Assessment


Current Contraction Pattern: Other (Specify) (none on monitor but pt feels occas

)





- FHR Assessment


  ** Rankin


FHR (bpm): 140


FHR Pattern Variability: Moderate


FHR Category: 1 (reactive pattern with good variability and accels, occas small 

variable decels.)





- AP


Antepartum Course: 





19 06:37


high survelliance with MFM and freq u/s - last on 1/10.  LGA at 91% with normal 

fluid.  





- Physical Exam


General Appearance: WD/WN (when awoken and needing to urinate)


Estimated Fetal Weight: Less than 2500g (1367 on last u/s)


Abdomen: non-tender, soft


Extremities: non-tender


Skin: normal color, warm/dry


Neuro/Psych: alert, normal mood/affect (blunt affect with bipolar on meds)





Oxytocin Orders Assessment





- Pre-Induction/Augmentation Assessment


Gestational Age: 28 week(s) and 0 day(s)





ICD10 Worksheet


Patient Problems: 


 Problems











Problem Status Onset


 


Bipolar 1 disorder Acute  


 


History of  delivery, currently pregnant Acute  


 


 contractions Acute  














- ICD10 Problem Qualifiers


(1)  contractions








(2) History of  delivery, currently pregnant








(3) Bipolar 1 disorder

## 2019-02-20 NOTE — OBPROG
Labor Progress Note


Assessment/Plan: 


Assessment: 30  at 33w4d with preeclampsia without severe features. Per 

LISA Hawkins (fellow,I believe, at [employer]), administer betamethasone, 

continue observation.  If stable and no severe features, could consider 

outpatient management until IOL at 37 weeks.  If severe features develop, 

induction at 34 weeks.  Will obtain GBS swab. 


1) BPAD - recent lithium dose increase - likely etiology of polydipsia, 

dizziness. 


2) Mild electrolyte abnormalities - likely related to lithium dosing and higher 

lithium level. 


3) hx of PTD, cerclage in place - currently having asymptomatic contractions 

over the past hour - will observe.  Will need to remove cerclage if signs of 

labor.  


4) hx of C/S - has had 2 successful VBACs, and desires another. 





Dr. Segundo Alston - hospitalist, evaluating and assisting with medication / 

electrolyte assessment and management. Much appreciated. 





Maida Miranda MD, FACOG


[facility]








19 16:48





Subjective/Intrapartum Course: 





19 17:22


Pt doing OK, says she feels "crummy", though she can't pinpoint why and has a 

hard time being more specific.  


Is feeling some mild contractions.  NO headache, still some blurry vision.  NO 

chest pain.  Feels like she has to breathe faster sometimes and can't take a 

big breath.  No vaginal bleeding (though progesterone suppositories are red).  

No LOF. 


19 17:50





Objective: 





 





 19 10:07 





 19 10:07 





 











Uric Acid  4.1 mg/dL (2.5-6.8)   19  10:07    


 


Total Bilirubin  0.3 mg/dL (0.1-1.4)   19  10:07    


 


Conjugated Bilirubin  0.2 mg/dL (0.0-0.5)   19  10:07    


 


Unconjugated Bilirubin  0.1 mg/dL (0.0-1.1)   19  10:07    


 


AST  24 IU/L (14-46)   19  10:07    


 


ALT  30 IU/L (9-52)   19  10:07    


 


Lactate Dehydrogenase  607 IU/L (313-618)   02/20/19  10:07    








37.3  21  98  128/73  97% on RA  


BP highs 1345  156/85


at 1530 140/63


at 1545 152/76


at 1630 137/90





gen - flushed but nontoxic in appearance


CV - RRR


chest - CTAB


abd -gravid, soft, NT


ext - no calf tenderness, trace edema, min DTRs





FHR - 150 reactive


toco - q 5-7 min from about 2635-0368








- Contraction Pattern Assessment


Current Contraction Pattern: Irregular





- FHR Assessment


  ** Rankin


FHR (bpm): 150


FHR Pattern Variability: Moderate


FHR Category: 1





Oxytocin Orders Assessment





- Pre-Induction/Augmentation Assessment


Gestational Age: 33 week(s) and 4 day(s)





ICD10 Worksheet


Patient Problems: 


 Problems











Problem Status Onset


 


Pre-eclampsia during pregnancy in third trimester, antepartum Acute  


 


Bipolar 1 disorder Acute  


 


History of  delivery, currently pregnant Acute  


 


History of  delivery, currently pregnant Acute  


 


 contractions Acute  














- ICD10 Problem Qualifiers


(1) Pre-eclampsia during pregnancy in third trimester, antepartum

## 2019-02-20 NOTE — OBPROG
Labor Progress Note


Assessment/Plan: 


Assessment: 30  at 33w4d with preeclampsia without severe features. Per 

LISA Hawkins (fellow,I believe, at [employer]), administer betamethasone, 

continue observation.  If stable and no severe features, could consider 

outpatient management until IOL at 37 weeks.  If severe features develop, 

induction at 34 weeks.  Will obtain GBS swab. 


1) BPAD - recent lithium dose increase - likely etiology of polydipsia, 

dizziness. 


2) Mild electrolyte abnormalities - likely related to lithium dosing and higher 

lithium level. 


3) hx of PTD, cerclage in place - currently having asymptomatic contractions 

over the past hour - will observe.  Will need to remove cerclage if signs of 

labor.  


4) hx of C/S - has had 2 successful VBACs, and desires another. 





Dr. Segundo Alston - hospitalist, evaluating and assisting with medication / 

electrolyte assessment and management. Much appreciated. 





Maida Miranda MD, FACOG


[facility]








19 16:48





As above -currently stable. s/p Betameth #1 at 1741.


Lithium level slightly low at approx 11 hours after last dose, so will continue 

with pt's regular dosing of 900 mg bid. 


GBS swab was obtained. 


Plan to repeat preeclampsia labs in AM. 


Maida Miranda MD


19 20:42





Subjective/Intrapartum Course: 





19 17:22


Pt doing OK, says she feels "crummy", though she can't pinpoint why and has a 

hard time being more specific.  


Is feeling some mild contractions.  NO headache, still some blurry vision.  NO 

chest pain.  Feels like she has to breathe faster sometimes and can't take a 

big breath.  No vaginal bleeding (though progesterone suppositories are red).  

No LOF. 


19 17:50





Pt feeling much better than she was earlier today.  No HA, no blurry vision, no 

dyspnea.  Occas mild contraction. 


Good FM.  


19 20:44





Objective: 





 





 19 10:07 





 19 19:12 





 











Uric Acid  4.1 mg/dL (2.5-6.8)   19  10:07    


 


Total Bilirubin  0.3 mg/dL (0.1-1.4)   19  10:07    


 


Conjugated Bilirubin  0.2 mg/dL (0.0-0.5)   19  10:07    


 


Unconjugated Bilirubin  0.1 mg/dL (0.0-1.1)   19  10:07    


 


AST  24 IU/L (14-46)   19  10:07    


 


ALT  30 IU/L (9-52)   19  10:07    


 


Lactate Dehydrogenase  607 IU/L (313-618)   19  10:07    








No changes, still labile BPs but all less than 155/95





Lithium 0.5


Mag 1.9


sodium 136





- Contraction Pattern Assessment


Current Contraction Pattern: Irregular





- FHR Assessment


  ** Rankin


FHR (bpm): 150


FHR Pattern Variability: Moderate


FHR Category: 1





Oxytocin Orders Assessment





- Pre-Induction/Augmentation Assessment


Gestational Age: 33 week(s) and 4 day(s)





ICD10 Worksheet


Patient Problems: 


 Problems











Problem Status Onset


 


Pre-eclampsia during pregnancy in third trimester, antepartum Acute  


 


Bipolar 1 disorder Acute  


 


History of  delivery, currently pregnant Acute  


 


History of  delivery, currently pregnant Acute  


 


 contractions Acute  














- ICD10 Problem Qualifiers


(1) Pre-eclampsia during pregnancy in third trimester, antepartum

## 2019-02-20 NOTE — PDGENHP
History and Physical


History and Physical: 





HOSPITALIST CONSULT NOTE


 CC:  I am asked by Dr. Miranda of [facility] to assess this patient who is admitted 

at 33+ weeks pregnancy with preeclampsia and elevated lithium level





HISTORY:


This patient is hospital today with 33 weeks pregnancy and has preeclampsia 

based on per blood pressures and urine studies.  She is being treated with 

betamethasone at this time and seems to be doing fairly well.  She is having 

some intermittent contractions, baby is doing well on monitor.  She has had 3 

prior deliveries and 2 other pregnancies.  I am asked to see the patient at 

this time because of an elevated lithium level in slightly low sodium.





The patient currently says she feels "off", and feels that is slightly 

difficult to get her breath though no cough or chest pain or fever


She has bipolar disorder and is chronically on lithium.  Her usual lithium 

level 0.6-0.8.  Recently she has had some luba type symptoms and was having 

lithium levels and 0.4-0.5.  2 weeks ago her lithium dose was increased, and 1 

week ago her lithium level apparently was 0.6.  Today it is 1.3 although does 

not drawn at a dose samir.  She does complain of some sense of tremulousness, 

and she has felt quite thirsty drinking up to 200 oz of fluids per day over the 

last week.  She has a low sodium at this point but minimally.





ROS:  A comprehensive 10 system review revealed no other significant findings








PAST MEDICAL HISTORY:


Bipolar disorder


Previous pregnancies


Exercise-induced asthma


Obesity


Otherwise healthy





FAMILY MEDICAL HISTORY:


Nothing contributory at this time





SOCIAL HISTORY:


Partner


No tobacco, no alcohol for the past 1.5 years, no street drugs


Has rarely used marijuana in the past





MEDICATIONS:


The patients list has been reconciled by our clinical pharmacist in the EMR.  I 

have reviewed the list and ordered appropriate medicines.





PHYSICAL EXAMINATION:


Vital Signs:  Pulse just over 100, respirations at 20-22 during my exam but not 

labored and they are shallow likely due to her very gravid abdomen and obesity; 

no fever and blood pressures started off high are now better in good range


Oxygen saturation is 98-99% during my examination on room air








Examination:


Fetal heart rate in good range on monitor with appropriate responses to 

contraction during my visit


General: alert, oriented, good mentation, relaxed


Skin: warm, dry, good color, no rash; no cyanosis


HEENT: normal


Neck: no mass or jvd


Resps: relaxed


Lungs: clear breath sounds


Heart: regular, no murmur


Abdomen:  Gravid, obese, soft, nontender, +BS


Upper Extremities: normal, well perfused


Lower Extremities: no edema, warm, well perfused


No Bleeding or bruising


Neurologic:  Normal mentation, normal speech/language, normal CNs, no focal 

weakness


IV site: looks normal








LABORATORY DATA:


Sodium initially 134, CO2 21 but with anion gap 4, normal renal function, 

normal liver enzymes and bilirubin


Lithium 1.3 this morning


Urine with pH 9.0 specific gravity 1.004, total protein 19 and creatinine 19.4


White count 55193, hemoglobin 12 normal platelets








ASSESSMENT:


* Elevated lithium level:  Given the timing of the blood draw from her doses 

hard to tell if this represents to excessive lithium but I suspect given her 

symptoms that it does.  We have held 1 dose and I am rechecking a lithium level 

now, will continue to follow closely and may make dosing adjustments based on 

levels


* Hyponatremia very mild, minimally hypervolemic for her gravid state


* Preeclampsia, diagnosed and being managed by OB Gyn


* Sense of dyspnea right now I think is likely attributable to her very gravid 

and obese abdomen, she is oxygenating extremely well, nothing to suggest PE and 

no signs of heart failure, not wheezing


* History of exercise induced asthma, will continue her inhaled steroid, p.r.n. 

Bronchodilator


* Hospital Medicine will continue follow throughout her stay








I have reviewed the patient's case in detail with Dr. Miranda


I have reviewed the patient's past medical records as part of this assessment, 

including

## 2019-02-21 NOTE — OBPROG
Labor Progress Note


Assessment/Plan: 


Assessment: 30  at 33w4d with preeclampsia without severe features. Per 

LISA Hawkins (fellow,I believe, at [employer]), administer betamethasone, 

continue observation.  If stable and no severe features, could consider 

outpatient management until IOL at 37 weeks.  If severe features develop, 

induction at 34 weeks.  Will obtain GBS swab. 


1) BPAD - recent lithium dose increase - likely etiology of polydipsia, 

dizziness. 


2) Mild electrolyte abnormalities - likely related to lithium dosing and higher 

lithium level. 


3) hx of PTD, cerclage in place - currently having asymptomatic contractions 

over the past hour - will observe.  Will need to remove cerclage if signs of 

labor.  


4) hx of C/S - has had 2 successful VBACs, and desires another. 





Dr. Segundo Alston - hospitalist, evaluating and assisting with medication / 

electrolyte assessment and management. Much appreciated. 





Maida Miranda MD, [facility]








19 16:48





As above -currently stable. s/p Betameth #1 at 1741.


Lithium level slightly low at approx 11 hours after last dose, so will continue 

with pt's regular dosing of 900 mg bid. 


GBS swab was obtained. 


Plan to repeat preeclampsia labs in AM. 


Maida Miranda MD


19 20:42





19 01:07


A/P: 30  at 33w5d  - preeclampsia without severe features, having some 

mild contractions, cerclage in place.


1) s/p betameth #1 at 1740 on 19.  GBS pending. Plan to repeat 

preeclampsia labs in AM. 


2) BPAD - lithium level at 11 hours after dose was sub goal range, so regular 

dose of LIthium 900mg was given.  Buff capped and taking PO.  Sodium 

normalized. 


3) Hx of PTD / cerclage in place - contractions subsided for a while but have 

become more noticeable now - will continue to observe again.  Will try Ambien 

for sleep, but pt knows to let us know if intensity increases. 


4) Hx of C/S with 2 successful VBACs, desires another - will need to sign  

consent when taking out cerclage. 





[facility]ida Miranda MD, [facility]


[facility]


19 01:17





Subjective/Intrapartum Course: 





19 17:22


Pt doing OK, says she feels "crummy", though she can't pinpoint why and has a 

hard time being more specific.  


Is feeling some mild contractions.  NO headache, still some blurry vision.  NO 

chest pain.  Feels like she has to breathe faster sometimes and can't take a 

big breath.  No vaginal bleeding (though progesterone suppositories are red).  

No LOF. 


19 17:50





Pt feeling much better than she was earlier today.  No HA, no blurry vision, no 

dyspnea.  Occas mild contraction. 


Good FM.  


19 20:44





Pt feeling "crummy" again, but feels now is due to contractions which are not 

painful, but some are uncomfortable. No HA, no vis changes, no dyspnea.  No 

change in vaginal dc, though thinks a large part of vaginal prometrium came out 

when she was up to void.  No vaginal bleeding. No LOF. 


19 01:13





Objective: 





 





 19 10:07 





 19 19:12 





 











Uric Acid  4.1 mg/dL (2.5-6.8)   19  10:07    


 


Total Bilirubin  0.3 mg/dL (0.1-1.4)   19  10:07    


 


Conjugated Bilirubin  0.2 mg/dL (0.0-0.5)   19  10:07    


 


Unconjugated Bilirubin  0.1 mg/dL (0.0-1.1)   19  10:07    


 


AST  24 IU/L (14-46)   19  10:07    


 


ALT  30 IU/L (9-52)   19  10:07    


 


Lactate Dehydrogenase  607 IU/L (313-618)   19  10:07    








36.4  18  90  134/76  97% on RA


gen - pleasant, but not as bright as she was earlier in the evening. 


CV - RRR


chest - CTAB


abd - gravid, soft, NT


ext - no calf tenderness





- Contraction Pattern Assessment


Current Contraction Pattern: Irregular (q4-6 min)





- FHR Assessment


  ** Rankin


FHR (bpm): 140


FHR Pattern Variability: Moderate


FHR Category: 1





Oxytocin Orders Assessment





- Pre-Induction/Augmentation Assessment


Gestational Age: 33 week(s) and 4 day(s)





ICD10 Worksheet


Patient Problems: 


 Problems











Problem Status Onset


 


Pre-eclampsia during pregnancy in third trimester, antepartum Acute  


 


Bipolar 1 disorder Acute  


 


History of  delivery, currently pregnant Acute  


 


History of  delivery, currently pregnant Acute  


 


 contractions Acute  














- ICD10 Problem Qualifiers


(1) Pre-eclampsia during pregnancy in third trimester, antepartum

## 2019-02-21 NOTE — OBPROG
Labor Progress Note


Assessment/Plan: 


Assessment: 30  at 33w4d with preeclampsia without severe features. Per 

LISA Hawkins (fellow,I believe, at [employer]), administer betamethasone, 

continue observation.  If stable and no severe features, could consider 

outpatient management until IOL at 37 weeks.  If severe features develop, 

induction at 34 weeks.  Will obtain GBS swab. 


1) BPAD - recent lithium dose increase - likely etiology of polydipsia, 

dizziness. 


2) Mild electrolyte abnormalities - likely related to lithium dosing and higher 

lithium level. 


3) hx of PTD, cerclage in place - currently having asymptomatic contractions 

over the past hour - will observe.  Will need to remove cerclage if signs of 

labor.  


4) hx of C/S - has had 2 successful VBACs, and desires another. 





Dr. Segundo Alston - hospitalist, evaluating and assisting with medication / 

electrolyte assessment and management. Much appreciated. 





[facility]ida Miranda MD, [facility]


[facility]








19 16:48





As above -currently stable. s/p Betameth #1 at 1741.


Lithium level slightly low at approx 11 hours after last dose, so will continue 

with pt's regular dosing of 900 mg bid. 


GBS swab was obtained. 


Plan to repeat preeclampsia labs in AM. 


Maida Miranda MD


19 20:42





19 01:07


A/P: 30  at 33w5d  - preeclampsia without severe features, having some 

mild contractions, cerclage in place.


1) s/p betameth #1 at 1740 on 19.  GBS pending. Plan to repeat 

preeclampsia labs in AM. 


2) BPAD - lithium level at 11 hours after dose was sub goal range, so regular 

dose of LIthium 900mg was given.  Buff capped and taking PO.  Sodium 

normalized. 


3) Hx of PTD / cerclage in place - contractions subsided for a while but have 

become more noticeable now - will continue to observe again.  Will try Ambien 

for sleep, but pt knows to let us know if intensity increases. 


4) Hx of C/S with 2 successful VBACs, desires another - will need to sign  

consent when taking out cerclage. 





Maida Miranda MD, FACOG


[facility]


19 01:17





A/P: 31 yo  at 33w5d - preeclampsia without severe features (by BPs > 4 

hr apart and p/c of 0.98), contractions resolved overnight and pt able to sleep 

after 0200. 


1) s/p betameth #1 at 1740 on 19, #2 today at 1740.  Preeclampsia labs 

drawn and pending this morning. Currently on regular diet with goal to manage 

expectantly at least until 34 weeks, with possibility of 37 weeks if does not 

develop severe features per MFM phone consult yesterday.  Plan for MFM consult 

in house today.  


2) BPAD - received normal dose of lithium last night, repeat level drawn this 

morning (about 9 hours after last dose). NNP to speak with pt today about 

breastfeeding risks associated with Lithium /  infant.  Electrolytes 

normalized last night. 


3) Hx of PTD/cerclage in place - no contractions for the past few hours. 


4) hx of  & 2 VBACs - desires repeat  - needs to sign  consent with or 

before cerclage removal





Maida Miranda MD, FACOG


[facility]


19 06:40





Subjective/Intrapartum Course: 





19 17:22


Pt doing OK, says she feels "crummy", though she can't pinpoint why and has a 

hard time being more specific.  


Is feeling some mild contractions.  NO headache, still some blurry vision.  NO 

chest pain.  Feels like she has to breathe faster sometimes and can't take a 

big breath.  No vaginal bleeding (though progesterone suppositories are red).  

No LOF. 


19 17:50





Pt feeling much better than she was earlier today.  No HA, no blurry vision, no 

dyspnea.  Occas mild contraction. 


Good FM.  


19 20:44





Pt feeling "crummy" again, but feels now is due to contractions which are not 

painful, but some are uncomfortable. No HA, no vis changes, no dyspnea.  No 

change in vaginal dc, though thinks a large part of vaginal prometrium came out 

when she was up to void.  No vaginal bleeding. No LOF. 


19 01:13





19 06:47


Pt sleeping soundly, and not aroused.  Observed - breathing easily.  Per RN - 

pt sleeping since about 0200.  


Objective: 





 











Uric Acid  4.1 mg/dL (2.5-6.8)   19  10:07    


 


Total Bilirubin  0.3 mg/dL (0.1-1.4)   19  10:07    


 


Conjugated Bilirubin  0.2 mg/dL (0.0-0.5)   19  10:07    


 


Unconjugated Bilirubin  0.1 mg/dL (0.0-1.1)   19  10:07    


 


AST  24 IU/L (14-46)   19  10:07    


 


ALT  30 IU/L (9-52)   19  10:07    


 


Lactate Dehydrogenase  607 IU/L (313-618)   19  10:07    








36.4  79  16  94% on RA  101/57  101-142/57-83 over past 5 hours


gen - sleeping, not aroused. Easy breathing. 





FHR 130s reactive, Cat 1.  Removed from monitoring with blood draw at 0600. 





- SVE


Membranes: Intact





- Contraction Pattern Assessment


Current Contraction Pattern: Irregular (q4-6 min), Other (Specify) (no 

contractions since about 0200)





- FHR Assessment


  ** Rankin


FHR (bpm): 130


FHR Pattern Variability: Moderate


FHR Category: 1





- AP


Antepartum Course: 


Pt admitted at 33w4d with dizziness.  Diagnosed with preeclampsia without 

severe features.  Given betamethasone on  and  at 33w4d and 33w5d. 


Hospitalist consultation re: Lithium and electrolytes on . 


MFM phone consult , MFM in house consult . 





19 06:55








Oxytocin Orders Assessment





- Pre-Induction/Augmentation Assessment


Gestational Age: 33 week(s) and 4 day(s)





ICD10 Worksheet


Patient Problems: 


 Problems











Problem Status Onset


 


Pre-eclampsia during pregnancy in third trimester, antepartum Acute  


 


Bipolar 1 disorder Acute  


 


History of  delivery, currently pregnant Acute  


 


History of  delivery, currently pregnant Acute  


 


 contractions Acute  














- ICD10 Problem Qualifiers


(1) Pre-eclampsia during pregnancy in third trimester, antepartum

## 2019-02-21 NOTE — OBPROG
Labor Progress Note


Assessment/Plan: 


Assessment:


29 y/o  @ 33 5/7 weeks admitted for pre-eclampsia without severe features

, also h/o pre term birth x 2 and premature cervical shortening with cerclage 

in place and Bipolar on Lithium.  























Plan:


Pt's symptoms have stabilized today.  We checked multiple Lithium levels and 

they are in therapeutic range now.  We will continue 900 mg BID.  She is unsure 

of the efficacy in terms of her mental health, but reports she is stable now 

and has follow-up scheduled with her therapist.


  


Appreciate MFM consult today.  Fetal growth is reassuring, of note baby has 

bilateral renal pylectasis and this will need to be followed after delivery.  

Dr. Owens agrees with the diagnosis of pre eclampsia without severe features.

  We will complete the course of BMZ tonight at 17:30, observe contractions as 

well as BP overnight.  If she remains stable, she can be d/c home tomorrow with 

modified activity, instructions to monitor BP at home.  She has f/up scheduled 

at [facility]  with a MD visit, labs and NST.  We will add Lithium 

levels to her weekly labs, because, if renal function becomes compromised with 

worsening pre eclampsia, Lithium level could rise and we want to ensure she 

doesn't develop toxic levels.  I carefully reviewed s/sx's of severe features 

of pre eclampsia.  Including: HA, scotomata, RUQ pain, worsening edema, BP >160/

100, decrease in UOP etc.  She is aware of the need to notify the doctor on 

call at [facility] immediately if these symptoms develop.  Delivery is recommended at 

37 weeks if she remains without severe features, 34 if she worsens. 





We will evaluate the timing for the removal of her cerclage near recommended 

delivery.   She verbalized, she would like it removed and would like to wait 

some time before active IOL with pitocin etc.  I expressed, we will need to see 

how she and the baby are at that time, but delivery is recommended at 37 weeks 

and we would not want to remove the cerclage prior if no s/sx's active labor.  


19 14:09





Subjective/Intrapartum Course: 





19 17:22


Pt doing OK, says she feels "crummy", though she can't pinpoint why and has a 

hard time being more specific.  


Is feeling some mild contractions.  NO headache, still some blurry vision.  NO 

chest pain.  Feels like she has to breathe faster sometimes and can't take a 

big breath.  No vaginal bleeding (though progesterone suppositories are red).  

No LOF. 


19 17:50





Pt feeling much better than she was earlier today.  No HA, no blurry vision, no 

dyspnea.  Occas mild contraction. 


Good FM.  


19 20:44





Pt feeling "crummy" again, but feels now is due to contractions which are not 

painful, but some are uncomfortable. No HA, no vis changes, no dyspnea.  No 

change in vaginal dc, though thinks a large part of vaginal prometrium came out 

when she was up to void.  No vaginal bleeding. No LOF. 


19 01:13





19 06:47


Pt sleeping soundly, and not aroused.  Observed - breathing easily.  Per RN - 

pt sleeping since about 0200.  


19 14:07


Pt is feeling better this afternoon.  She reports dizziness, HA and visual 

changes have resolved.  She has some nausea and upset stomach, but is eating 

well.  She denies contractions, cramping or uterine pain and is feeling good 

FM.  


Objective: 





 





 19 06:00 





 19 06:00 





 











Uric Acid  3.7 mg/dL (2.5-6.8)   19  06:00    


 


Total Bilirubin  0.3 mg/dL (0.1-1.4)   19  10:07    


 


Conjugated Bilirubin  0.2 mg/dL (0.0-0.5)   19  10:07    


 


Unconjugated Bilirubin  0.1 mg/dL (0.0-1.1)   19  10:07    


 


AST  19 IU/L (14-46)   19  06:00    


 


ALT  23 IU/L (9-52)   19  06:00    


 


Lactate Dehydrogenase  607 IU/L (313-618)   19  10:07    














- SVE


Membranes: Intact





- Contraction Pattern Assessment


Current Contraction Pattern: Irregular (q4-6 min), Other (Specify) (min 

contractions, irritability)





- FHR Assessment


  ** Rankin


FHR (bpm): 140


FHR Pattern Variability: Moderate


FHR Category: 1





- AP


Antepartum Course: 


Pt admitted at 33w4d with dizziness.  Diagnosed with preeclampsia without 

severe features.  Given betamethasone on  and  at 33w4d and 33w5d. 


Hospitalist consultation re: Lithium and electrolytes on . 


MFM phone consult , MFM in house consult . 





19 06:55








Oxytocin Orders Assessment





- Pre-Induction/Augmentation Assessment


Gestational Age: 33 week(s) and 4 day(s)





ICD10 Worksheet


Patient Problems: 


 Problems











Problem Status Onset


 


Pre-eclampsia during pregnancy in third trimester, antepartum Acute  


 


Bipolar 1 disorder Acute  


 


History of  delivery, currently pregnant Acute  


 


History of  delivery, currently pregnant Acute  


 


 contractions Acute

## 2019-02-22 NOTE — OBPROG
Labor Progress Note


Assessment/Plan: 


Assessment:


























Plan:





Subjective/Intrapartum Course: 





19 17:22


Pt doing OK, says she feels "crummy", though she can't pinpoint why and has a 

hard time being more specific.  


Is feeling some mild contractions.  NO headache, still some blurry vision.  NO 

chest pain.  Feels like she has to breathe faster sometimes and can't take a 

big breath.  No vaginal bleeding (though progesterone suppositories are red).  

No LOF. 


19 17:50





Pt feeling much better than she was earlier today.  No HA, no blurry vision, no 

dyspnea.  Occas mild contraction. 


Good FM.  


19 20:44





Pt feeling "crummy" again, but feels now is due to contractions which are not 

painful, but some are uncomfortable. No HA, no vis changes, no dyspnea.  No 

change in vaginal dc, though thinks a large part of vaginal prometrium came out 

when she was up to void.  No vaginal bleeding. No LOF. 


19 01:13





19 06:47


Pt sleeping soundly, and not aroused.  Observed - breathing easily.  Per RN - 

pt sleeping since about 0200.  


19 14:07


Pt is feeling better this afternoon.  She reports dizziness, HA and visual 

changes have resolved.  She has some nausea and upset stomach, but is eating 

well.  She denies contractions, cramping or uterine pain and is feeling good 

FM.  


Objective: 





 





 19 06:00 





 19 06:00 





 











Uric Acid  3.7 mg/dL (2.5-6.8)   19  06:00    


 


Total Bilirubin  0.3 mg/dL (0.1-1.4)   19  10:07    


 


Conjugated Bilirubin  0.2 mg/dL (0.0-0.5)   19  10:07    


 


Unconjugated Bilirubin  0.1 mg/dL (0.0-1.1)   19  10:07    


 


AST  19 IU/L (14-46)   19  06:00    


 


ALT  23 IU/L (9-52)   19  06:00    


 


Lactate Dehydrogenase  607 IU/L (313-618)   19  10:07    


 


Group B Strep DNA  NEGATIVE  (NEGATIVE)   19  20:00    














- SVE


Membranes: Intact





- Contraction Pattern Assessment


Current Contraction Pattern: Irregular (q4-6 min), Other (Specify) (min 

contractions, irritability)





- AP


Antepartum Course: 


Pt admitted at 33w4d with dizziness.  Diagnosed with preeclampsia without 

severe features.  Given betamethasone on  and  at 33w4d and 33w5d. 


Hospitalist consultation re: Lithium and electrolytes on . 


MFM phone consult , MFM in house consult . 





19 06:55








Oxytocin Orders Assessment





- Pre-Induction/Augmentation Assessment


Gestational Age: 33 week(s) and 4 day(s)





ICD10 Worksheet


Patient Problems: 


 Problems











Problem Status Onset


 


Pre-eclampsia during pregnancy in third trimester, antepartum Acute  


 


Bipolar 1 disorder Acute  


 


History of  delivery, currently pregnant Acute  


 


History of  delivery, currently pregnant Acute  


 


 contractions Acute

## 2019-02-22 NOTE — OBGCSDC
General Delivery Information





- General Info


: 7


Para: 3


Abortions: 3


Admission Date: 19


Labs: 





 











Hct  35.7 % (38.0-47.0)  L  19  06:00    


 


Group B Strep DNA  NEGATIVE  (NEGATIVE)   19  20:00    














- Hospital Course


Antepartum: 


Pt admitted at 33w4d with dizziness.  Diagnosed with preeclampsia without 

severe features.  Given betamethasone on  and  at 33w4d and 33w5d. 


Hospitalist consultation re: Lithium and electrolytes on . 


MFM phone consult , MFM in house consult . 





19 06:55





Intrapartum: 





19 17:22


Pt doing OK, says she feels "crummy", though she can't pinpoint why and has a 

hard time being more specific.  


Is feeling some mild contractions.  NO headache, still some blurry vision.  NO 

chest pain.  Feels like she has to breathe faster sometimes and can't take a 

big breath.  No vaginal bleeding (though progesterone suppositories are red).  

No LOF. 


19 17:50





Pt feeling much better than she was earlier today.  No HA, no blurry vision, no 

dyspnea.  Occas mild contraction. 


Good FM.  


19 20:44





Pt feeling "crummy" again, but feels now is due to contractions which are not 

painful, but some are uncomfortable. No HA, no vis changes, no dyspnea.  No 

change in vaginal dc, though thinks a large part of vaginal prometrium came out 

when she was up to void.  No vaginal bleeding. No LOF. 


19 01:13





19 06:47


Pt sleeping soundly, and not aroused.  Observed - breathing easily.  Per RN - 

pt sleeping since about 0200.  


19 14:07


Pt is feeling better this afternoon.  She reports dizziness, HA and visual 

changes have resolved.  She has some nausea and upset stomach, but is eating 

well.  She denies contractions, cramping or uterine pain and is feeling good 

FM.  





 Birth Data


KRISTI: 19


Gestational Age: 33 week(s) and 6 day(s)





Discharge Information





- Discharge Information


Condition: Fair
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
## 2019-03-09 NOTE — OBPROG
Labor Progress Note


Assessment/Plan: 


Assessment: Cerclage removal done - pt tolerated well.  





Plan: Observe for 1-2 hours.  If no evidence of labor, may dc home, FU as 

scheduled with twice weekly NSTs and induction at 37 weeks. 








19 13:22





A/P: 30  at 36 wk, a few hours s/p cerclage removal, no evidence of 

labor. Preeclampsia without severe features. BPs and PIH labs stable. 


Will DC home.  Fu as scheduled with NST 2x/wk and a physician visit this week. 


SULMA Haji, present for long discussion about recommendation to induce at 37 

weeks, which is next Saturday.  


Pt with high anxiety about induction, as 2 previous inductions both over 24 

hours, about risks since it is a  attempt.  Prefers to go into spontaneous 

labor. 


Discussed that if she declines induction at 37 weeks, as recommended by [facility] and 

evidence based medicine guidelines / ACOG, that she would be going against 

medical advice.  





Plan - I will see her in the office on Friday after her NST, at 36w6d, and 

check her cervix and sweep her membranes.  


Induction will be scheduled for Saturday morning with Dr. Duarte.  If she 

declines induction, I recommended she come in for additional monitoring on 

Saturday, and labs, and to sign an Against Medical Advice form, saying she is 

willing to accept the risks associated with declining induction, in the setting 

of preeclampsia, at 37 weeks. 


Pt currently agreeable to NST Mon, doc visit Wed, NST and doc visit with me and 

sweeping membranes on Friday at 36w6d.  





Maida Miranda MD, FACOG


[facility]





19 17:35








Subjective/Intrapartum Course: 





19 17:45


Pt has been sleeping intermittently since eating lunch after cerclage removal.  

Occasional contraction, no other changes. 


Objective: 





 





 19 13:00 





 19 13:00 





 











Uric Acid  3.8 mg/dL (2.5-6.8)   19  13:00    


 


Total Bilirubin  0.3 mg/dL (0.1-1.4)   19  13:00    


 


Conjugated Bilirubin  0.3 mg/dL (0.0-0.5)   19  13:00    


 


Unconjugated Bilirubin  0.0 mg/dL (0.0-1.1)   19  13:00    


 


AST  19 IU/L (14-46)   19  13:00    


 


ALT  35 IU/L (9-52)   19  13:00    


 


Lactate Dehydrogenase  514 IU/L (313-618)   19  13:00    








36.9  18  95  128/73


gen - pleasant, NAD








- Contraction Pattern Assessment


Current Contraction Pattern: Other (Specify) (occasional)





- FHR Assessment


  ** Rankin


FHR (bpm): 140


FHR Pattern Variability: Moderate


FHR Category: 1





- Physical Exam


General Appearance: WD/WN


Estimated Fetal Weight: 2501-3400g





Oxytocin Orders Assessment





- Pre-Induction/Augmentation Assessment


Gestational Age: 36 week(s) and 0 day(s)





ICD10 Worksheet


Patient Problems: 


 Problems











Problem Status Onset


 


Cervical cerclage suture present in third trimester Acute  


 


Bipolar 1 disorder Acute  


 


History of  delivery, currently pregnant Acute  


 


History of  delivery, currently pregnant Acute  


 


Pre-eclampsia during pregnancy in third trimester, antepartum Acute  


 


 contractions Acute  














- ICD10 Problem Qualifiers


(1) Cervical cerclage suture present in third trimester

## 2019-03-09 NOTE — SOAPPROG
SOAP Progress Note


Assessment/Plan: 


Assessment: Cerclage removal done - pt tolerated well.  





Plan: Observe for 1-2 hours.  If no evidence of labor, may dc home, FU as 

scheduled with twice weekly NSTs and induction at 37 weeks. 








19 13:22





Subjective: 





Pt comfortable, agrees to cerclage removal per plan. 


Objective: 





Procedure - verbal consent obtained. 


Pt placed in stirrups in labor bed.  Sterile speculum inserted.  With the 

assistance of a sponge stick, was able to see suture and knot at about 3:00.  

Was able to grasp knot and cut underlying suture and extract all of suture.  Pt 

jacqueline well.  Will likely have some spotting. 


SVE after cerclage removal: 2 / 0 / -4, cephalic on palpation. 








- Time Spent With Patient


Time Spent With Patient: 





30 min





- Pending Discharge


Pending Discharge Within 24 Hours: Yes


Pending Discharge Within 48 Hours: Yes


Pending Discharge Date: 03/10/19


Pending Discharge Time: 11:00





ICD10 Worksheet


Patient Problems: 


 Problems











Problem Status Onset


 


Cervical cerclage suture present in third trimester Acute  


 


Bipolar 1 disorder Acute  


 


History of  delivery, currently pregnant Acute  


 


History of  delivery, currently pregnant Acute  


 


Pre-eclampsia during pregnancy in third trimester, antepartum Acute  


 


 contractions Acute  














- ICD10 Problem Qualifiers


(1) Cervical cerclage suture present in third trimester

## 2019-03-15 NOTE — OBPROG
Labor Progress Note


Assessment/Plan: 


Assessment:








31 y/o  @ 36 6/7 weeks who presented with ctx's in early latent labor


preeclampsia without severe features


h/o c/s with P1 and successful  x 2











Plan:





Continue expectant management


Pt was reexamined by RN and no cervical change was noted


FHTs - Cat I tracing, reassuring


BPs elevated, but not in severe range; pt is asymptomatic; LDH is only abnormal 

lab and elevated at 627


Will reassess in a few hours for cervical change








03/15/19 06:41





Subjective/Intrapartum Course: 





03/15/19 06:46


Pt was up ambulating and is breathing through her contractions; no real change 

since admission


Objective: 





 





 03/15/19 01:25 





 03/15/19 01:25 





 











Patient ABO/Rh  O POSITIVE   03/15/19  01:25    


 


Uric Acid  4.7 mg/dL (2.5-6.8)   03/15/19  01:25    


 


Total Bilirubin  0.3 mg/dL (0.1-1.4)   03/15/19  01:25    


 


Conjugated Bilirubin  0.2 mg/dL (0.0-0.5)   03/15/19  01:25    


 


Unconjugated Bilirubin  0.1 mg/dL (0.0-1.1)   03/15/19  01:25    


 


AST  27 IU/L (14-46)   03/15/19  01:25    


 


ALT  30 IU/L (9-52)   03/15/19  01:25    


 


Lactate Dehydrogenase  627 IU/L (313-618)  H  03/15/19  01:25    














- SVE


Dilation (cm): 4 (4-5)


Effacement (%): 50


Station: -2


Membranes: Intact





- Contraction Pattern Assessment


Current Contraction Pattern: Irregular (q3-5 min - hard to trace on the monitor)





- FHR Assessment


  ** Rankin


FHR (bpm): 140


FHR Pattern Variability: Moderate


FHR Category: 1





Oxytocin Orders Assessment





- Pre-Induction/Augmentation Assessment


Gestational Age: 36 week(s) and 6 day(s)





ICD10 Worksheet


Patient Problems: 


 Problems











Problem Status Onset


 


Bipolar 1 disorder Acute  


 


Cervical cerclage suture present in third trimester Acute  


 


History of  delivery, currently pregnant Acute  


 


History of  delivery, currently pregnant Acute  


 


Pre-eclampsia during pregnancy in third trimester, antepartum Acute  


 


 contractions Acute
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
## 2019-03-15 NOTE — OBPROG
Labor Progress Note


Assessment/Plan: 


Assessment: 30  at 36w6d here for prodromal labor, with underlying 

preeclampsia without severe features, with induction planned for 37 weeks.





Plan: Comfort measures now.  Will begin induction closer to midnight when will 

be 37 weeks. 


Maida Miranda MD, ,FACOG





03/15/19 15:29





Subjective/Intrapartum Course: 





03/15/19 06:46


Pt was up ambulating and is breathing through her contractions; no real change 

since admission


03/15/19 15:31


Pt umcomfortable with contractions, better sitting or standing. Willing to try 

the tub.


Objective: 





 





 03/15/19 01:25 





 03/15/19 01:25 





 











Patient ABO/Rh  O POSITIVE   03/15/19  01:25    


 


Uric Acid  4.7 mg/dL (2.5-6.8)   03/15/19  01:25    


 


Total Bilirubin  0.3 mg/dL (0.1-1.4)   03/15/19  01:25    


 


Conjugated Bilirubin  0.2 mg/dL (0.0-0.5)   03/15/19  01:25    


 


Unconjugated Bilirubin  0.1 mg/dL (0.0-1.1)   03/15/19  01:25    


 


AST  27 IU/L (14-46)   03/15/19  01:25    


 


ALT  30 IU/L (9-52)   03/15/19  01:25    


 


Lactate Dehydrogenase  627 IU/L (313-618)  H  03/15/19  01:25    








gen - pleasant, NAD when not efrain. 





- SVE


Membranes: Intact





- Contraction Pattern Assessment


Current Contraction Pattern: Irregular (q3-5 min - hard to trace on the monitor)





- FHR Assessment


  ** Rankin


FHR (bpm): 140


FHR Pattern Variability: Moderate


FHR Category: 1





Oxytocin Orders Assessment





- Pre-Induction/Augmentation Assessment


Gestational Age: 36 week(s) and 6 day(s)





ICD10 Worksheet


Patient Problems: 


 Problems











Problem Status Onset


 


Bipolar 1 disorder Acute  


 


Cervical cerclage suture present in third trimester Acute  


 


History of  delivery, currently pregnant Acute  


 


History of  delivery, currently pregnant Acute  


 


Pre-eclampsia during pregnancy in third trimester, antepartum Acute  


 


 contractions Acute

## 2019-03-16 NOTE — OBPROG
Labor Progress Note


Assessment/Plan: 


Assessment: 30  at 36w6d here for prodromal labor, with underlying 

preeclampsia without severe features, with induction planned for 37 weeks.





Plan: Comfort measures now.  Will begin induction closer to midnight when will 

be 37 weeks. 


Maida Miranda MD, ,FACOG





03/15/19 15:29





A/P:30  at 37 wk, here originally for prodromal labor - has not kicked 

into active labor yet, no cervix change as of 2300.  Preeclampsia without 

severe features, intermittently high blood pressures.  Will proceed with 

pitocin induction.  B/R/A of pitocin discussed.   consent signed 24 hours 

ago with Dr. Villafuerte.  





19 01:42





A/P: 30 , 37 wk, IOL for preeclampsia at 37 wk, . 


S/P SROM, making progress on pitocin.  Continue pitocin and continuous 

monitoring, anticipate vag delivery. 


Maida Miranda MD


19 06:45





Subjective/Intrapartum Course: 





03/15/19 06:46


Pt was up ambulating and is breathing through her contractions; no real change 

since admission


03/15/19 15:31


Pt uncomfortable with contractions, better sitting or standing. Willing to try 

the tub.


19 01:57


Pt still uncomfortable but contractions have not increased. Agrees to proceed 

with pitocin induction. 





19 06:46


Pt getting more uncomfortable, breathing through contractions since SROM at 

0541 occurred. Desires no clamping of cord until placenta delivered.  


Objective: 





 





 03/15/19 01:25 





 03/15/19 01:25 





 











Patient ABO/Rh  O POSITIVE   03/15/19  01:25    


 


Uric Acid  4.7 mg/dL (2.5-6.8)   03/15/19  01:25    


 


Total Bilirubin  0.3 mg/dL (0.1-1.4)   03/15/19  01:25    


 


Conjugated Bilirubin  0.2 mg/dL (0.0-0.5)   03/15/19  01:25    


 


Unconjugated Bilirubin  0.1 mg/dL (0.0-1.1)   03/15/19  01:25    


 


AST  27 IU/L (14-46)   03/15/19  01:25    


 


ALT  30 IU/L (9-52)   03/15/19  01:25    


 


Lactate Dehydrogenase  627 IU/L (313-618)  H  03/15/19  01:25    








gen - pleasant, NAD


SVE  /  / -2 much less posterior





- SVE


Dilation (cm): 5


Effacement (%): 75


Station: -2


Membranes: SROM


Amniotic Fluid Color: Clear





- Contraction Pattern Assessment


Current Contraction Pattern: Regular





- FHR Assessment


  ** Rankin


FHR (bpm): 140 (occasional varible and occasional early appearing decel)


FHR Pattern Variability: Moderate


FHR Category: 1





Oxytocin Orders Assessment





- Pre-Induction/Augmentation Assessment


Gestational Age: 36 week(s) and 6 day(s)





ICD10 Worksheet


Patient Problems: 


 Problems











Problem Status Onset


 


Bipolar 1 disorder Acute  


 


Cervical cerclage suture present in third trimester Acute  


 


History of  delivery, currently pregnant Acute  


 


History of  delivery, currently pregnant Acute  


 


Pre-eclampsia during pregnancy in third trimester, antepartum Acute  


 


 contractions Acute

## 2019-03-16 NOTE — OBPROG
Labor Progress Note


Assessment/Plan: 


Assessment:


IUP at 37 wks - augmentation of prodromal labor


preeclampsia, without severe features


h/o PTD - s/p 17-OHP, cerclage x2 removed at 36 wks


PCS, PVBAC x2 - desiring ÁNGELA


LGA at 97% EFW


Bipolar on [place]


GBS - , SROM at 5:41


NNP aware of fetal severe hydronephrosis, slight atyp appearance on echo


























Plan:


Natural labor, feeling pushy and got out of tub for check - 0


19 08:30





Subjective/Intrapartum Course: 





03/15/19 06:46


Pt was up ambulating and is breathing through her contractions; no real change 

since admission


03/15/19 15:31


Pt uncomfortable with contractions, better sitting or standing. Willing to try 

the tub.


19 01:57


Pt still uncomfortable but contractions have not increased. Agrees to proceed 

with pitocin induction. 





19 06:46


Pt getting more uncomfortable, breathing through contractions since SROM at 

0541 occurred. Desires no clamping of cord until placenta delivered.  


19 08:45


Pt was in tub and feeling more pressure, exam showed 0 - rechecked approx 

45 min later and /0 - is trying nitrous


Objective: 





 





 03/15/19 01:25 





 03/15/19 01:25 





 











Patient ABO/Rh  O POSITIVE   03/15/19  01:25    


 


Uric Acid  4.7 mg/dL (2.5-6.8)   03/15/19  01:25    


 


Total Bilirubin  0.3 mg/dL (0.1-1.4)   03/15/19  01:25    


 


Conjugated Bilirubin  0.2 mg/dL (0.0-0.5)   03/15/19  01:25    


 


Unconjugated Bilirubin  0.1 mg/dL (0.0-1.1)   03/15/19  01:25    


 


AST  27 IU/L (14-46)   03/15/19  01:25    


 


ALT  30 IU/L (9-52)   03/15/19  01:25    


 


Lactate Dehydrogenase  627 IU/L (313-618)  H  03/15/19  01:25    














- SVE


Dilation (cm): 7


Effacement (%): 100


Station: 0


Membranes: SROM


Amniotic Fluid Color: Clear





- Contraction Pattern Assessment


Current Contraction Pattern: Regular (q 2-4 min on 16 mu/min pit)





- FHR Assessment


  ** Rankin


FHR (bpm): 150


FHR Pattern Variability: Moderate


FHR Category: 2 (variable decels with ctxns - quick recovery)





Oxytocin Orders Assessment





- Pre-Induction/Augmentation Assessment


Gestational Age: 36 week(s) and 6 day(s)





ICD10 Worksheet


Patient Problems: 


 Problems











Problem Status Onset


 


Bipolar 1 disorder Acute  


 


Cervical cerclage suture present in third trimester Acute  


 


History of  delivery, currently pregnant Acute  


 


History of  delivery, currently pregnant Acute  


 


Pre-eclampsia during pregnancy in third trimester, antepartum Acute  


 


 contractions Acute

## 2019-03-16 NOTE — OBPROG
Labor Progress Note


Assessment/Plan: 


Assessment:


IUP at 37 wks - augmentation of prodromal labor


preeclampsia, without severe features


h/o PTD - s/p 17-OHP, cerclage x2 removed at 36 wks


PCS, PVBAC x2 - desiring ÁNGELA


LGA at 97% EFW


Bipolar on [place]


GBS - , SROM at 5:41


NNP aware of fetal severe hydronephrosis, slight atyp appearance on echo


























Plan:


Natural labor, - using nitrous,  now 100/0


19 08:30





19 10:14





Subjective/Intrapartum Course: 





03/15/19 06:46


Pt was up ambulating and is breathing through her contractions; no real change 

since admission


03/15/19 15:31


Pt uncomfortable with contractions, better sitting or standing. Willing to try 

the tub.


19 01:57


Pt still uncomfortable but contractions have not increased. Agrees to proceed 

with pitocin induction. 





19 06:46


Pt getting more uncomfortable, breathing through contractions since SROM at 

0541 occurred. Desires no clamping of cord until placenta delivered.  


19 08:45


Pt was in tub and feeling more pressure, exam showed 7/90/0 - rechecked approx 

45 min later and 7/100/0 - is trying nitrous


19 10:15


pt feeling a lot of pressure, requests check - 100/0, bloody show, good 

benefit with nitrous


Objective: 





 





 03/15/19 01:25 





 03/15/19 01:25 





 











Patient ABO/Rh  O POSITIVE   03/15/19  01:25    


 


Uric Acid  4.7 mg/dL (2.5-6.8)   03/15/19  01:25    


 


Total Bilirubin  0.3 mg/dL (0.1-1.4)   03/15/19  01:25    


 


Conjugated Bilirubin  0.2 mg/dL (0.0-0.5)   03/15/19  01:25    


 


Unconjugated Bilirubin  0.1 mg/dL (0.0-1.1)   03/15/19  01:25    


 


AST  27 IU/L (14-46)   03/15/19  01:25    


 


ALT  30 IU/L (9-52)   03/15/19  01:25    


 


Lactate Dehydrogenase  627 IU/L (313-618)  H  03/15/19  01:25    














- SVE


Dilation (cm): 9


Effacement (%): 100


Station: 0


Membranes: SROM


Amniotic Fluid Color: Clear





- Contraction Pattern Assessment


Current Contraction Pattern: Regular (q 3-4 on 16 pit)





- FHR Assessment


  ** Rankin


FHR (bpm): 130


FHR Pattern Variability: Moderate


FHR Category: 1 (occas early decels - accels)





Oxytocin Orders Assessment





- Pre-Induction/Augmentation Assessment


Gestational Age: 36 week(s) and 6 day(s)





ICD10 Worksheet


Patient Problems: 


 Problems











Problem Status Onset


 


Encounter for induction of labor Acute  


 


Bipolar 1 disorder Acute  


 


History of  delivery, currently pregnant Acute  


 


Pre-eclampsia during pregnancy in third trimester, antepartum Acute
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
## 2019-03-19 NOTE — ASMTCMCOM
CM Note

 

CM Note                       

Notes:

CM consult requested by RN. CM met with pt and she requested CM to assist in making sure she has 

linkage with [facility] ([facility]) through [facility] ([phone]).  CM 

spoke with Intake from [facility] and they did not see that the referral was initiated. CM scheduled an 

appt with pt's counselor Meghana Yen. CM informed pt of this and was appreciative of the information. 



CM spoke with Donna from [facility] [phone] who was not near her computer but asked if CM could 

submit a new referral. CM did. 



Pt reports she has friends who are helpful; friend was visiting at bedside. She reported no 

concerns getting home and said that she had WIC and did not have concerns obtaining food after 

discharge. Pt reports she is able to get to her appt at [facility] tomorrow. 

 

No other CM needs identified at this time. 



 

 

 

Date Signed:  03/19/2019 02:51 PM

Electronically Signed By:SIMONE Sullivan

## 2019-03-19 NOTE — ASMTCMCOM
CM Note

 

CM Note                       

Notes:

Late Entry by Genet Fallon on 3/18/19:



Assistance requested by SULMA Saleh.  Pt is currently on F Mom/Baby after delivery of her son on 

Saturday.  Pt reported that she struggled with undiagnosed bipolar disorder until last May when 

Lithium was prescribed. She indicated that her symptoms have been managed since she began taking 

600mg of Lithium. When she became pregnant she and her doctors made the decision that it was better 


for her to continue taking it.  Her dosage at the time of her pregnancy was 1800mg. Her current 

dosage is 900 mg.  She reported that she is an active client of [facility] and her psychiatrist is 

Dr. Robles.  She sees him via telehealth but she does not have an appointment scheduled for her next 

visit. She has enough medication at this time but she does not have her prescriptions with her at 

the hospital.  She declined assistance with scheduling an appointment with her provider. 

The pt. has three other children ages 5, 7 and 10. She lives in an apartment in [place] and the 

father of her children (Leonides) lives in an apartment in [place]. They are in the process of 

reconciling and she hopes to move in with him in  when her lease expires. Her  is in 

NICU and will not be discharged today.  She does not believe she is able to drive at this time and 

is unsure how to make arrangements should she be discharged.  Her  is unable to bring their 

3 children because the 5 year old has croup. Pt would like to stay with the baby but is concerned 

about her medication and being able to eat.  She questioned the possibility of being able to stay 

another night and indicated she would likely be able to have someone from [facility] pick her up 

tomorrow.  She is hopeful that the baby will be discharged tomorrow. This CM to check in with 

pt. in the afternoon. CM to continue to follow/ support as needed.

 

Date Signed:  2019 01:10 PM

Electronically Signed By:Nohemy Pozo RN

## 2019-03-19 NOTE — OBGCSDC
General Delivery Information





- General Info


: 6


Para: 4 (3 successful VBACs)


Abortions: 2


Birth Type: Vaginal


L&D Analgesia/Anesthesia Type: Nitrous


Admission Date: 03/15/19


Labs: 





 











Patient ABO/Rh  O POSITIVE   03/15/19  01:25    


 


Hct  31.4 % (38.0-47.0)  L  19  04:30    














- Hospital Course


Intrapartum: 





03/15/19 06:46


Pt was up ambulating and is breathing through her contractions; no real change 

since admission


03/15/19 15:31


Pt uncomfortable with contractions, better sitting or standing. Willing to try 

the tub.


19 01:57


Pt still uncomfortable but contractions have not increased. Agrees to proceed 

with pitocin induction. 





19 06:46


Pt getting more uncomfortable, breathing through contractions since SROM at 

0541 occurred. Desires no clamping of cord until placenta delivered.  


19 08:45


Pt was in tub and feeling more pressure, exam showed 7/90/0 - rechecked approx 

45 min later and 7/100/0 - is trying nitrous


19 10:15


pt feeling a lot of pressure, requests check - 9/100/0, bloody show, good 

benefit with nitrous


Postpartum: 





19 13:30


Doing well.  Perineum is sore.  Ambulating, voiding and jacqueline regular diet 

without issue.  Skin to skin with baby in recliner right now.  Says 

breastfeeding is going well. Mod lochia. 


19 11:24


Pt seen and examined. She is having some cramping and soreness with vaginal 

teats that is not well controlled with Tylenol-she usually takes ES. Minimal 

lochia. Pt is OOB, jacqueline regular diet, voiding and passing flatus. No BM yet. 

Denies any HAs, visual changes or RUQ pain. BF is going well. Baby boy is in 

NICU under bili lights.


19 12:23


Pt doing well.  oxycodone helping with increased discomfort of uterine cramping 

and discomfort with stitches.  No HA, vis changes or RUQ pain.  Min lochia. jacqueline 

reg diet, + flatus but no BM yet - requests laxative.  Baby to be discharged 

today after echo. 





Vaginal Birth





- Delivery Provider


Delivery Physician/CNM: Therese Duarte





- Diagnosis


Labor: Augmented


Rupture of Membranes Type: Spontaneous


Amniotic Fluid Color: Clear


Laceration: 2nd Degree (MLL, also two minimal periurethral tears)


Repair: 3-0, Vicryl





 Birth





-  Delivery


EBL: 400





Datto Birth Data


KRISTI: 19


Gestational Age: 37 week(s) and 3 day(s)


  ** Rankin


Delivery Date: 19


Delivery Time: 10:26


Sex of Infant: Male


Datto Weight (gm): 3204 g


Apgar Score (1 Min): 6


Apgar Score (5 Min): 8


Apgar Score (10 Min): 10





Discharge Information





- Discharge Information


Prescriptions: 


oxyCODONE IR [Oxycodone Ir (*)] 5 mg PO Q4HRS PRN #18 tab


 PRN Reason: Pain, Severe Able To Take Po


Condition: Good


Instruction/Follow Up: See Instruction Sheet, Two Weeks (Visit with [facility] therapist at [facility]), Six Weeks (2 

appointments in a row - with physician at [facility] - physical exam 

followed by Insertion of Mirena IUD)
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
## 2023-05-01 NOTE — EDPHY
CALLED PT- NO ANSWER. LEFT VM TO CALL BACK TO CLARIFY WHICH INJECTION SO WE CAN GET THE REQUEST TO DR VALE.   HPI/HX/ROS/PE/MDM


Narrative: 





CHIEF COMPLAINT:  Left foot injury





HPI: The patient is a 29-year-old female with a rather complex medical history 

including history of urinary retention and Asperger syndrome.  She reports 

being in her normal state of health until 4:00 p.m. When she stepped wrong off 

of and uneven surface, causing an inversion injury to her left foot and ankle.  

She is able to bear weight with a lot of pain.  She denies other injury.  She 

denies recent illness.





REVIEW OF SYSTEMS:


Aside from elements discussed in the HPI, a comprehensive 10-point review of 

systems was reviewed and is negative.





PMH:  Includes Asperger's, history of urinary tension.





SOCIAL HISTORY:  Has children at home.  Denies drug abuse.





PHYSICAL EXAM:


General:Patient is alert, in no acute distress.


Skin: Normal color.  No rash.  Warm and dry.


Extremities:  Left foot:  There is mild ecchymosis to the distal 5th metatarsal 

with associated tenderness and swelling.  There is also mild tenderness to the 

lateral malleolus.  Capillary refill and color is normal throughout remainder 

foot.  There is no evidence of infection or foreign body.


Neuro: Oriented x3.  Normal motor function.  Normal sensory function.


MDM: 





This patient presents with foot and ankle pain in the setting of inversion 

injury.  No signs of fracture, dislocation or infection are present on exam/XR. 

She was placed in a cast shoe and will be discharged home with orthopedic 

referral.





- Data Points


Imaging Results: 


 Imaging Impressions





Foot X-Ray  02/15/18 17:48


Impression:


1. No acute osseous abnormality seen left foot.


2. Mild nonspecific soft tissue swelling adjacent to the fifth MTP joint.


 











Imaging: Discussed imaging studies w/ On call Radiologist, I viewed and 

interpreted images myself


Medications Given: 


 








Discontinued Medications





Acetaminophen (Tylenol)  1,000 mg PO EDNOW ONE


   Stop: 02/15/18 17:36


   Last Admin: 02/15/18 17:39 Dose:  1,000 mg








General


Time Seen by Provider: 02/15/18 17:33


Initial Vital Signs: 


 Initial Vital Signs











Heart Rate  105 H  02/15/18 17:30


 


Respiratory Rate  18   02/15/18 17:30


 


Blood Pressure  142/82 H  02/15/18 17:30


 


O2 Sat (%)  96   02/15/18 17:30








 











O2 Delivery Mode               Room Air














Allergies/Adverse Reactions: 


 





adhesive [Adhesive] Allergy (Verified 02/15/18 18:36)


 


aspirin [Aspirin] Allergy (Verified 02/15/18 18:36)


 


clindamycin [Clindamycin] Allergy (Verified 02/15/18 18:36)


 


cyclobenzaprine HCl [From Flexeril] Allergy (Verified 02/15/18 18:36)


 


gabapentin [From Neurontin] Allergy (Verified 02/15/18 18:36)


 


haloperidol [From Haldol] Allergy (Verified 02/15/18 18:38)


 


hydromorphone HCl [From Dilaudid] Allergy (Verified 02/15/18 18:36)


 


ipratropium bromide [From Atrovent] Allergy (Verified 02/15/18 18:36)


 


ketorolac tromethamine [From Toradol] Allergy (Verified 02/15/18 18:36)


 


latex [Latex] Allergy (Verified 02/15/18 18:36)


 


lorazepam [From Ativan] Allergy (Verified 02/15/18 18:36)


 


nabumetone [From Relafen] Allergy (Verified 02/15/18 18:36)


 


prednisone [Prednisone] Allergy (Verified 02/15/18 18:36)


 


vancomycin [Vancomycin] Allergy (Verified 02/15/18 18:36)


 








Home Medications: 














 Medication  Instructions  Recorded


 


Enlite Serter  12/16/17


 


Prazosin HCl  12/16/17


 


Prozac 10 MG (*)  12/16/17














Departure





- Departure


Disposition: Home, Routine, Self-Care


Clinical Impression: 


 Sprain of foot, left





Condition: Good


Instructions:  Foot Sprain (ED)


Additional Instructions: 


Rest, ice, elevation.  Follow up with an orthopedic surgeon within one week if 

pain persists.  Return to the emergency department for worsening pain, swelling

, numbness, weakness or other concerns.  Wear splint for comfort, weight bear 

as tolerated.


Referrals: 


Rose Sifuentes MD [Primary Care Provider] - As per Instructions


Socrates Winston MD [Medical Doctor] - As per Instructions